# Patient Record
Sex: FEMALE | Race: WHITE | NOT HISPANIC OR LATINO | Employment: OTHER | URBAN - METROPOLITAN AREA
[De-identification: names, ages, dates, MRNs, and addresses within clinical notes are randomized per-mention and may not be internally consistent; named-entity substitution may affect disease eponyms.]

---

## 2017-01-17 ENCOUNTER — ALLSCRIPTS OFFICE VISIT (OUTPATIENT)
Dept: OTHER | Facility: OTHER | Age: 62
End: 2017-01-17

## 2017-02-10 ENCOUNTER — ALLSCRIPTS OFFICE VISIT (OUTPATIENT)
Dept: OTHER | Facility: OTHER | Age: 62
End: 2017-02-10

## 2017-02-22 ENCOUNTER — ALLSCRIPTS OFFICE VISIT (OUTPATIENT)
Dept: OTHER | Facility: OTHER | Age: 62
End: 2017-02-22

## 2017-03-01 ENCOUNTER — ALLSCRIPTS OFFICE VISIT (OUTPATIENT)
Dept: OTHER | Facility: OTHER | Age: 62
End: 2017-03-01

## 2017-04-11 ENCOUNTER — ALLSCRIPTS OFFICE VISIT (OUTPATIENT)
Dept: OTHER | Facility: OTHER | Age: 62
End: 2017-04-11

## 2017-05-01 ENCOUNTER — ALLSCRIPTS OFFICE VISIT (OUTPATIENT)
Dept: OTHER | Facility: OTHER | Age: 62
End: 2017-05-01

## 2017-06-21 ENCOUNTER — ALLSCRIPTS OFFICE VISIT (OUTPATIENT)
Dept: OTHER | Facility: OTHER | Age: 62
End: 2017-06-21

## 2017-06-21 DIAGNOSIS — Z12.39 ENCOUNTER FOR OTHER SCREENING FOR MALIGNANT NEOPLASM OF BREAST: ICD-10-CM

## 2017-06-27 ENCOUNTER — HOSPITAL ENCOUNTER (OUTPATIENT)
Dept: RADIOLOGY | Facility: HOSPITAL | Age: 62
Discharge: HOME | End: 2017-06-27
Attending: FAMILY MEDICINE
Payer: MEDICARE

## 2017-06-27 DIAGNOSIS — Z12.39 ENCOUNTER FOR OTHER SCREENING FOR MALIGNANT NEOPLASM OF BREAST: ICD-10-CM

## 2017-06-27 PROCEDURE — G0202 SCR MAMMO BI INCL CAD: HCPCS

## 2017-07-11 ENCOUNTER — ALLSCRIPTS OFFICE VISIT (OUTPATIENT)
Dept: OTHER | Facility: OTHER | Age: 62
End: 2017-07-11

## 2017-08-01 ENCOUNTER — GENERIC CONVERSION - ENCOUNTER (OUTPATIENT)
Dept: FAMILY MEDICINE CLINIC | Facility: CLINIC | Age: 62
End: 2017-08-01

## 2017-08-31 ENCOUNTER — ALLSCRIPTS OFFICE VISIT (OUTPATIENT)
Dept: OTHER | Facility: OTHER | Age: 62
End: 2017-08-31

## 2017-08-31 ENCOUNTER — GENERIC CONVERSION - ENCOUNTER (OUTPATIENT)
Dept: OTHER | Facility: OTHER | Age: 62
End: 2017-08-31

## 2017-08-31 DIAGNOSIS — E78.5 HYPERLIPIDEMIA: ICD-10-CM

## 2017-08-31 DIAGNOSIS — E03.9 HYPOTHYROIDISM: ICD-10-CM

## 2017-09-05 ENCOUNTER — APPOINTMENT (OUTPATIENT)
Dept: LAB | Facility: HOSPITAL | Age: 62
End: 2017-09-05
Attending: FAMILY MEDICINE
Payer: MEDICARE

## 2017-09-05 ENCOUNTER — TRANSCRIBE ORDERS (OUTPATIENT)
Dept: ADMINISTRATIVE | Facility: HOSPITAL | Age: 62
End: 2017-09-05

## 2017-09-05 DIAGNOSIS — E03.9 HYPOTHYROIDISM: ICD-10-CM

## 2017-09-05 DIAGNOSIS — E78.5 HYPERLIPIDEMIA: ICD-10-CM

## 2017-09-05 LAB
ALBUMIN SERPL BCP-MCNC: 3.2 G/DL (ref 3.5–5)
ALP SERPL-CCNC: 66 U/L (ref 46–116)
ALT SERPL W P-5'-P-CCNC: 46 U/L (ref 12–78)
ANION GAP SERPL CALCULATED.3IONS-SCNC: 7 MMOL/L (ref 4–13)
AST SERPL W P-5'-P-CCNC: 26 U/L (ref 5–45)
BILIRUB SERPL-MCNC: 0.2 MG/DL (ref 0.2–1)
BUN SERPL-MCNC: 16 MG/DL (ref 5–25)
CALCIUM SERPL-MCNC: 8.7 MG/DL (ref 8.3–10.1)
CHLORIDE SERPL-SCNC: 102 MMOL/L (ref 100–108)
CHOLEST SERPL-MCNC: 199 MG/DL (ref 50–200)
CO2 SERPL-SCNC: 31 MMOL/L (ref 21–32)
CREAT SERPL-MCNC: 0.93 MG/DL (ref 0.6–1.3)
GFR SERPL CREATININE-BSD FRML MDRD: 67 ML/MIN/1.73SQ M
GLUCOSE P FAST SERPL-MCNC: 98 MG/DL (ref 65–99)
HDLC SERPL-MCNC: 71 MG/DL (ref 40–60)
LDLC SERPL CALC-MCNC: 94 MG/DL (ref 0–100)
POTASSIUM SERPL-SCNC: 4.4 MMOL/L (ref 3.5–5.3)
PROT SERPL-MCNC: 6.8 G/DL (ref 6.4–8.2)
SODIUM SERPL-SCNC: 140 MMOL/L (ref 136–145)
TRIGL SERPL-MCNC: 168 MG/DL
TSH SERPL DL<=0.05 MIU/L-ACNC: 6 UIU/ML (ref 0.36–3.74)

## 2017-09-05 PROCEDURE — 84443 ASSAY THYROID STIM HORMONE: CPT

## 2017-09-05 PROCEDURE — 36415 COLL VENOUS BLD VENIPUNCTURE: CPT

## 2017-09-05 PROCEDURE — 80061 LIPID PANEL: CPT

## 2017-09-05 PROCEDURE — 80053 COMPREHEN METABOLIC PANEL: CPT

## 2017-09-06 ENCOUNTER — GENERIC CONVERSION - ENCOUNTER (OUTPATIENT)
Dept: OTHER | Facility: OTHER | Age: 62
End: 2017-09-06

## 2017-10-10 ENCOUNTER — GENERIC CONVERSION - ENCOUNTER (OUTPATIENT)
Dept: OTHER | Facility: OTHER | Age: 62
End: 2017-10-10

## 2017-11-01 ENCOUNTER — GENERIC CONVERSION - ENCOUNTER (OUTPATIENT)
Dept: OTHER | Facility: OTHER | Age: 62
End: 2017-11-01

## 2017-11-01 DIAGNOSIS — E03.9 HYPOTHYROIDISM: ICD-10-CM

## 2017-12-11 ENCOUNTER — TRANSCRIBE ORDERS (OUTPATIENT)
Dept: ADMINISTRATIVE | Facility: HOSPITAL | Age: 62
End: 2017-12-11

## 2017-12-11 ENCOUNTER — APPOINTMENT (OUTPATIENT)
Dept: LAB | Facility: HOSPITAL | Age: 62
End: 2017-12-11
Attending: FAMILY MEDICINE
Payer: MEDICARE

## 2017-12-11 DIAGNOSIS — E03.9 MYXEDEMA HEART DISEASE: Primary | ICD-10-CM

## 2017-12-11 DIAGNOSIS — E03.9 HYPOTHYROIDISM: ICD-10-CM

## 2017-12-11 DIAGNOSIS — I51.9 MYXEDEMA HEART DISEASE: Primary | ICD-10-CM

## 2017-12-11 DIAGNOSIS — E03.9 MYXEDEMA HEART DISEASE: ICD-10-CM

## 2017-12-11 DIAGNOSIS — I51.9 MYXEDEMA HEART DISEASE: ICD-10-CM

## 2017-12-11 LAB — TSH SERPL DL<=0.05 MIU/L-ACNC: 0.29 UIU/ML (ref 0.36–3.74)

## 2017-12-11 PROCEDURE — 84443 ASSAY THYROID STIM HORMONE: CPT

## 2017-12-11 PROCEDURE — 36415 COLL VENOUS BLD VENIPUNCTURE: CPT

## 2018-01-09 ENCOUNTER — ALLSCRIPTS OFFICE VISIT (OUTPATIENT)
Dept: OTHER | Facility: OTHER | Age: 63
End: 2018-01-09

## 2018-01-10 NOTE — PROGRESS NOTES
Assessment   1  Callus (700) (L84)   2  Atherosclerosis of arteries of extremities (440 20) (I70 209)   3  Pain in extremity (729 5) (M79 609)   4  Deformity of ankle and foot, acquired (736 70) (M21 969)    Plan    · Follow-up visit in 9 weeks Evaluation and Treatment  Follow-up  Status: Hold For -    Scheduling  Requested for: 65GLN8040   · Inspect your feet and legs daily if you have vascular disease ; Status:Complete;   Done:    55EXT2717 03:37PM    Discussion/Summary      Daily foot checks monitor for signs of infection  The patient, patient's caretaker was counseled regarding instructions for management,-- risk factor reductions,-- patient and family education,-- importance of compliance with treatment  The treatment plan was reviewed with the patient/guardian  The patient/guardian understands and agrees with the treatment plan      Chief Complaint   Patient is referred from [facility] for foot care  Patient has painful calluses, and, thick, painful nails that hurt when walking and wearing shoes  History of Present Illness   HPI: Patient has extremely, small stature, and very, small feet, stream pes planus painful calluses first and fifth, bilateral  Pain when walking  Thick, painful nails      Active Problems   1  Adult hypothyroidism (244 9) (E03 9)   2  Atherosclerosis of arteries of extremities (440 20) (I70 209)   3  Behavior problems (V40 9)   4  BMI 27 0-27 9,adult (V85 23) (Z68 27)   5  Callus (700) (L84)   6  Cerumen debris on tympanic membrane of left ear (380 4) (H61 22)   7  Deformity of ankle and foot, acquired (736 70) (M21 969)   8  Depression (311) (F32 9)   9  Difficulty In Walking Involving An Ankle/Foot Joint (719 7)   10  Hyperlipidemia (272 4) (E78 5)   11  Hypothyroidism (244 9) (E03 9)   12  Need for influenza vaccination (V04 81) (Z23)   13  Osteoporosis (733 00) (M81 0)   14  Pain in extremity (729 5) (M79 609)   15  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)   16   Trisomy 21, Down syndrome (758 0) (Q90 9)   17  Xerosis cutis (706 8) (L85 3)    Past Medical History    · History of Depression with anxiety (300 4) (F41 8)   · History of Dermatomycosis (111 9) (B36 9)   · History of Encounter for screening breast examination (V76 10) (Z12 31)   · History of Encounter for screening colonoscopy (V76 51) (Z12 11)   · History of Excessive cerumen in ear canal, bilateral (380 4) (H61 23)   · History of Fissure of skin (709 8) (R23 4)   · History of Foot joint pain (719 47) (M25 579)   · History of abrasion (V15 59) (Z87 828)   · History of athlete's foot (V12 09) (Z86 19)   · History of Down syndrome (758 0) (Q90 9)   · History of dysphagia (V12 79) (Z87 19)   · History of hyperkalemia (V12 29) (Z86 39)   · History of nausea and vomiting (V12 79) (D07 982)   · History of screening mammography (V15 89) (Z92 89)   · Hypothyroidism (244 9) (E03 9)   · History of Paronychia of toe, unspecified laterality (681 11) (L03 039)     The active problems and past medical history were reviewed and updated today  Family History   Mother    · No pertinent family history  Family History    · Family history of No Significant Family History    Social History    · Never A Smoker   · Never A Smoker   · Never Drank Alcohol   · Never Used Drugs    Current Meds    1  Alendronate Sodium 10 MG Oral Tablet; TAKE 1 TABLET BY MOUTH MORNING (8AM)     -[facility]; Therapy: 45LMG7170 to (Evaluate:86Hkn7791)  Requested for: 11Ive6611; Last     Rx:15Abz9618 Ordered   2  Daily Vitamin Plus Oral Capsule; TAKE 1 CAPSULE DAILY  Requested for: 57VMO9320; Last Rx:45Jdt6831 Ordered   3  Daily Angella Oral Tablet; TAKE 1 TABLET BY MOUTH EVERY DAY (8AM) -[facility]; Therapy: 89OQR7557 to (Last Rx:48Ybb2989)  Requested for: 79Fmc6925 Ordered   4  Debrox 6 5 % Otic Solution; INSTILL 4 DROP Twice daily at 7 am and 9pm Monday wednesday and Friday;      Therapy: 10UTS2027 to (Evaluate:10Mar2017)  Requested for: 15EXE5542; Last Rx:93Wuq5576 Ordered   5  Levothyroxine Sodium 50 MCG Oral Tablet; TAKE 1 TABLET BY MOUTH EVERY DAY     (8AM) -[facility]; Therapy: 57NXO4565 to (Evaluate:41Oxu2503)  Requested for: 62Yrt3057; Last     Rx:80Imx8148 Ordered   6  Levothyroxine Sodium 75 MCG Oral Tablet; take one tablet by mouth at 7 am daily     before rest of morning meds; Therapy: 66NTJ6897 to (Evaluate:30Apr2018)  Requested for: 14EGI2565; Last     Rx:01Nov2017 Ordered   7  LORazepam 0 5 MG Oral Tablet; TAKE 1 TABLET 3 TIMES DAILY AS NEEDED; Therapy: (Recorded:83Yui6241) to Recorded   8  Luvox 25 MG TABS; Take 1 tablet twice daily; Therapy: (Recorded:01May2017) to Recorded   9  Memantine HCl - 5 MG Oral Tablet; TAKE ONE TABLET BY MOUTH IN THE MORNING     (8AM) -[facility]; Therapy: 72IJT3298 to (Evaluate:00Lyb8901)  Requested for: 43Rnv2921; Last     Rx:88Esb6231 Ordered   10  Multi-Vitamins Oral Tablet; TAKE 1 TABLET BY MOUTH EVERY DAY (8AM) -[facility]; Therapy: 15UYU3940 to (Evaluate:26Mar2018)  Requested for: 27Oct2017; Last      Rx:44Hra8089 Ordered   11  Namenda 5 MG Oral Tablet; Take 1 tablet by mouth once daily at 8 a m; Therapy: 14ROH8400 to (Cintia Alejandra)  Requested for: 42RGW9552; Last      IZ:80UGP3547 Ordered   12  Simvastatin 20 MG Oral Tablet; TAKE 1 TABLET BY MOUTH EVERY DAY (8AM)      [facility]; Therapy: 71ZYK8757 to (Vince Temple)  Requested for: 59CDB2527; Last      Rx:63Kxp5757 Ordered   13  Vitamin D3 1000 UNIT Oral Tablet; TAKE 2 TABLETS (2000U) BY MOUTH EVERY DAY      AT 8PM (8PM) -[address] [place] [address]; Therapy: 78PZO5744 to (Evaluate:66Orl4130)  Requested for: 02Cgi7294; Last      Rx:38Mrv3263 Ordered     The medication list was reviewed and updated today  Allergies   1  No Known Drug Allergies  2  No Known Environmental Allergies   3   No Known Food Allergies    Vitals    Recorded: 84DWW3847 03:27PM   Heart Rate 78   Respiration 17   Height 4 ft 3 75 in   Weight 105 lb    BMI Calculated 27 57   BSA Calculated 1 28     Physical Exam        Constitutional: no acute distress, well appearing and well nourished  Cardiovascular: abnormal dorsalis pedis pulse,-- abnormal posterior tibialis pulse,-- dependence rubor-- and-- abnormal capillary refill  Orthopedic/Biomechanical: abnormal foot type,-- hammertoe(s),-- abnormal MPJ ROM,-- abnormal midtarsal joint ROM,-- abnormal subtalar joint ROM,-- decreased strength with dorsiflexion,-- decreased strength with plantarflexion,-- discolored nails,-- subungual debris-- and-- nail tenderness  Skin: abnormal texture,-- decreased skin turgor-- and-- keratoses  Left Foot: Appearance: Normal except as noted: a deformity-- and-- pes planus  Great toe deformities include a bunion  Second toe deformities include hammer toe  Third toe deformities include hammer toe  Forth toe deformities include hammer toe  Fifth toe deformities include hammer toe  Significant deformity of, bilateral feet, hammertoes, contracted, MPJs plantarflexed first met head  Special Tests: pre-ulcerative hyperkeratotic lesions plantar first metatarsal head bilateral right worse than left  Significant bunion deformity rigidly contracted hammertoes bilateral     Right Foot: Appearance: a deformity-- and-- pes planus  Great toe deformities include a bunion  Negative erythema  moderate edema mild hyperpigmentation lower legs bilateral  Negative calf tenderness  No signs of infection  Special Tests: All nails thick discolored dystrophic, positive subungual debris, positive pain on palpation  Neurological Exam: performed  Light touch was decreased bilaterally  Vibratory sensation was decreased in both first metatarsophalangeal joints  Response to monofilament test was intact bilaterally  Vascular Exam: Dorsalis pedis pulses were absent bilaterally  Posterior tibial pulses were absent bilaterally  Dependence rubor was present bilaterally   Capillary refill time was greater than 3 seconds bilaterally  Edema: mild bilaterally  Toenails: All of the toenails were elongated,-- hypertrophied,-- discolored,-- shown to have subungual debris-- and-- tender  Hyperkeratosis: present on both first sub metatarsals-- and-- present on both fifth sub metatarsals  Shoe Gear Evaluation: performed ()  Right Foot: width: d-- and-- length: 5  Left Foot: width: d-- and-- length: 5  Recommendation(s): athletic shoes  Procedure           Procedure: Debridement of hyperkeratosis  The procedure's risks and infection risk were discussed with the patient  Procedure Note:     Anesthesia: Aseptic debridement #10 scalpel  The area for debridement was located on the First metatarsal head  Fifth metatarsal head bilateral  Debridement was performed on partial thickness hyperkeratotic area(s)  Post-Procedure: the patient tolerated the procedure well  Complications: there were no complications  Procedure: toenail debridement performed  Indications for the procedure include professional performance of nail care required due to peripheral arterial disease-- and-- physical limitations  The procedure's risks and infection risk were discussed with the patient and with the guardian  Procedure Note: The toenails were debrided using heavy-duty nail nippers,-- with a nail ,-- the nail edges were smoothed-- and-- the hyperkeratotic material was grinded  Post-Procedure: the patient tolerated the procedure well  Complications: there were no complications  Aseptic debridement and planing of nails x10, manually, and mechanically debridement of pretrophic ulceration hyperkeratotic lesion first metatarsal head and hallux IPJ bilateral with #10 scalpel debrided nonviable tissue down to healthy viable tissue      Future Appointments      Date/Time Provider Specialty Site   02/14/2018 08:30 AM ASHISH Mixon   Family Medicine [facility]     Signatures    Electronically signed by :  Won Augustin DPM; Jan 9 2018  3:38PM EST                       (Author)

## 2018-01-10 NOTE — PROGRESS NOTES
Assessment    1  Encounter for preventive health examination (V70 0) (Z00 00)   2  Trisomy 21, Down syndrome (758 0) (Q90 9)    Plan  Depression, Depression with anxiety    · Memantine HCl - 5 MG Oral Tablet; TAKE ONE TABLET BY MOUTH IN THE  MORNING (8AM) -[facility]  Health Maintenance    · Multi-Vitamins Oral Tablet; TAKE ONE TABLET BY MOUTH DAILY AT 8 A M  Osteoporosis    · Alendronate Sodium 10 MG Oral Tablet; TAKE 1 TABLET BY MOUTH MORNING  (8AM) -[facility]   · Vitamin D3 1000 UNIT Oral Tablet; TAKE 2 TABLETS (2000U) BY MOUTH  EVERY DAY AT 8PM -JIM    Discussion/Summary  health maintenance visit      Chief Complaint  CPE 62 yo      History of Present Illness  HM, Adult Female: The patient is being seen for a health maintenance and mentally challenged evaluation  The last health maintenance visit was 12 month(s) ago  General Health: The patient's health since the last visit is described as good   patient is mostly non verbal    Lifestyle:  She consumes a diverse and healthy diet  She has weight concerns  She does not exercise regularly  She does not use tobacco  She denies alcohol use  She denies drug use  Screening:      Review of Systems    Constitutional: No fever, no chills, feels well, no tiredness, no recent weight gain or weight loss  Eyes: No complaints of eye pain, no red eyes, no eyesight problems, no discharge, no dry eyes, no itching of eyes  ENT: no complaints of earache, no loss of hearing, no nose bleeds, no nasal discharge, no sore throat, no hoarseness  Cardiovascular: No complaints of slow heart rate, no fast heart rate, no chest pain, no palpitations, no leg claudication, no lower extremity edema  Respiratory: No complaints of shortness of breath, no wheezing, no cough, no SOB on exertion, no orthopnea, no PND  Gastrointestinal: No complaints of abdominal pain, no constipation, no nausea or vomiting, no diarrhea, no bloody stools     Genitourinary: No complaints of dysuria, no incontinence, no pelvic pain, no dysmenorrhea, no vaginal discharge or bleeding  Musculoskeletal: No complaints of arthralgias, no myalgias, no joint swelling or stiffness, no limb pain or swelling  Integumentary: No complaints of skin rash or lesions, no itching, no skin wounds, no breast pain or lump  Neurological: No complaints of headache, no confusion, no convulsions, no numbness, no dizziness or fainting, no tingling, no limb weakness, no difficulty walking  Psychiatric: Not suicidal, no sleep disturbance, no anxiety or depression, no change in personality, no emotional problems  Hematologic/Lymphatic: No complaints of swollen glands, no swollen glands in the neck, does not bleed easily, does not bruise easily  Active Problems    1  Atherosclerosis of arteries of extremities (440 20) (I70 209)   2  Behavior problems (V40 9)   3  BMI 27 0-27 9,adult (V85 23) (Z68 27)   4  Callus (700) (L84)   5  Choking episode (784 99) (R09 89)   6  Deformity of ankle and foot, acquired (736 70) (M21 969)   7  Depression (311) (F32 9)   8  Depression with anxiety (300 4) (F41 8)   9  Difficulty In Walking Involving An Ankle/Foot Joint (719 7)   10  Dysphagia (787 20) (R13 10)   11  Encounter for screening colonoscopy (V76 51) (Z12 11)   12  Encounter for screening mammogram for malignant neoplasm of breast (V76 12)    (Z12 31)   13  Hyperlipidemia (272 4) (E78 5)   14  Hypothyroidism (244 9) (E03 9)   15  Osteoporosis (733 00) (M81 0)   16  Pain in extremity (729 5) (M79 609)   17  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)   18  Trisomy 21, Down syndrome (758 0) (Q90 9)   19   Xerosis cutis (706 8) (L85 3)    Past Medical History    · History of Cerumen debris on tympanic membrane of left ear (380 4) (H61 22)   · History of Dermatomycosis (111 9) (B36 9)   · History of Fissure of skin (709 8) (R23 4)   · History of Foot joint pain (719 47) (M25 579)   · History of athlete's foot (V12 09) (Z86 19)   · History of Down syndrome (758 0) (Q90 9)   · History of hyperkalemia (V12 29) (Z86 39)   · Hypothyroidism (244 9) (E03 9)   · History of Paronychia of toe, unspecified laterality (681 11) (L03 039)    Family History  Mother    · No pertinent family history  Family History    · Family history of No Significant Family History    Social History    · Never A Smoker   · Never A Smoker   · Never Drank Alcohol   · Never Used Drugs    Current Meds   1  Alendronate Sodium 10 MG Oral Tablet; TAKE 1 TABLET BY MOUTH MORNING (8AM)   -[facility]; Therapy: 04BED4197 to (Last Rx:21Mar2016)  Requested for: 21Mar2016 Ordered   2  Daily Vitamin Plus Oral Capsule; TAKE 1 CAPSULE DAILY  Requested for: 52EFT1678; Last Rx:82Avp5241 Ordered   3  Daily Angella Oral Tablet; TAKE 1 TABLET BY MOUTH EVERY DAY (8AM) [facility]; Therapy: 92JNM5325 to (Last Rx:22May2015)  Requested for: 47SZN6936 Ordered   4  Debrox 6 5 % Otic Solution; USE AS DIRECTED; Therapy: 25IHV1261 to (Last Rx:17May2016) Ordered   5  FluvoxaMINE Maleate 25 MG Oral Tablet; TAKE 2 TABLETS AT BEDTIME; Therapy: (Recorded:74Awd0798) to Recorded   6  Levothyroxine Sodium 50 MCG Oral Tablet; TAKE 1 TABLET BY MOUTH EVERY DAY   (8AM) -[facility]; Therapy: 95OLJ2130 to (Last Rx:18Feb2016)  Requested for: 84SSF8649 Ordered   7  LORazepam 0 5 MG Oral Tablet; TAKE 1 TABLET 3 TIMES DAILY AS NEEDED; Therapy: (Recorded:09Hkh2912) to Recorded   8  Memantine HCl - 5 MG Oral Tablet; TAKE ONE TABLET BY MOUTH IN THE MORNING   (8AM) -[facility]; Therapy: 71EWM0033 to (Last Rx:18Feb2016)  Requested for: 01WQL2701 Ordered   9  Multi-Vitamins Oral Tablet; TAKE ONE TABLET BY MOUTH DAILY AT 8 A M;   Therapy: 99LGB7572 to (Last Rx:22Jan2016)  Requested for: 10OCV6660 Ordered   10  Namenda 5 MG Oral Tablet; Take 1 tablet by mouth once daily at 8 a m; Therapy: 43VQL8348 to (Ji Rojas)  Requested for: 72QOB8918; Last    TN:12TFU3207 Ordered   11   Simvastatin 20 MG Oral Tablet; TAKE 1 TABLET BY MOUTH EVERY DAY (8AM)    -[facility]; Therapy: 82CKL3249 to (Last Rx:40Tra8183)  Requested for: 28SCQ5704 Ordered   12  Vitamin D 1000 UNIT Oral Tablet; TAKE 2 TABLETS (2000U) BY MOUTH EVERY DAY AT    8PM -[facility]; Therapy: 32RXW2314 to (Last Rx:81Htp7684)  Requested for: 62HYW6536 Ordered   13  Vitamin D3 1000 UNIT Oral Tablet; TAKE 2 TABLETS (2000U) BY MOUTH EVERY DAY    AT 8PM [facility]; Therapy: 82LJD0017 to (Last Rx:90Mnb9214)  Requested for: 94Uby6163 Ordered    Allergies    1  No Known Drug Allergies    2  No Known Environmental Allergies   3  No Known Food Allergies    Vitals   Recorded: 15MAK8924 09:28AM   Temperature 96 8 F   Heart Rate 75   Respiration 18   Systolic 96   Diastolic 60   Height 4 ft 3 75 in   Weight 103 lb    BMI Calculated 27 04   BSA Calculated 1 26   O2 Saturation 98     Physical Exam    Constitutional short  Head and Face   Head and face: Normal     Palpation of the face and sinuses: No sinus tenderness  Eyes   Conjunctiva and lids: No swelling, erythema or discharge  Pupils and irises: Equal, round, reactive to light  Ears, Nose, Mouth, and Throat   Otoscopic examination: Tympanic membranes translucent with normal light reflex  Canals patent without erythema  Hearing: Normal     Nasal mucosa, septum, and turbinates: Normal without edema or erythema  Lips, teeth, and gums: Normal, good dentition  Oropharynx: Normal with no erythema, edema, exudate or lesions  Neck   Neck: Supple, symmetric, trachea midline, no masses  Pulmonary   Respiratory effort: No increased work of breathing or signs of respiratory distress  Auscultation of lungs: Clear to auscultation  Cardiovascular   Palpation of heart: Normal PMI, no thrills  Auscultation of heart: Normal rate and rhythm, normal S1 and S2, no murmurs  Chest done by separate gyn  Abdomen   Abdomen: Non-tender, no masses  Genitourinary separate gyn     Lymphatic   Palpation of lymph nodes in neck: No lymphadenopathy  Palpation of lymph nodes in axillae: No lymphadenopathy  Musculoskeletal   Gait and station: Normal     Neurologic   Reflexes: 2+ and symmetric  Coordination: Normal finger to nose and heel to shin  Psychiatric unable  Future Appointments    Date/Time Provider Specialty Site   07/15/2016 03:45 PM Faye Denney DPM Podiatry [address] [facility]     Signatures   Electronically signed by :  ASHISH Baker ; Jul 14 2016 10:36AM EST                       (Author)

## 2018-01-10 NOTE — PROCEDURES
Procedures by ISMAEL Camilo at 2016 11:15 AM      Author:  ISMAEL Camilo Service:  (none) Author Type:  Speech and Language Pathologist     Filed:  2016 11:28 AM Date of Service:  2016 11:15 AM Status:  Signed     :  ISMAEL Camilo (Speech and Language Pathologist)                                               Video Fluoroscopic Swallow Study      Patient Name: Catheline Dance Today's Date: 2016  par  par  Past Medical History  Past Medical History     Diagnosis  Date    Dementia     Disease of thyroid gland     Diverticulosis     Down's syndrome     Fatty liver     Hyperlipidemia     Mental retardation     Osteoporosis     Psychiatric disorder         Past Surgical History  No past surgical history on file  General Information:  Ordering Physician: Dr Renny Valdes  Radiologist: Dr Chetna Balderas  Date of Order: 16  Date of Evaluation: 16  Type of Study: Initial MBS  Diet Prior to this Study: Regular diet and thin liquids  Past Medical History: Down's Syndrome, Dementia, MR, HLD, OP, and diverticulosis  Additional History: Patient with choking episodes on chicken (solid foods)  Positionin degrees in the lateral plane  Materials Administered: Puree, soft/ solid foods, and thin liquid    Oral Stage:  Impaired  Puree: Within functional limits  Soft: Poor mastication  Regular: Poor mastication  Thin Liquid:  Within functional limits  Oral Stage Performance: Mildly impaired for soft/ solid foods due to limited dentition and reduced mastication  Oral Phase Summary: Patient with decreased chew of soft/ solid foods and very rapid swallow trigger  Pharyngeal Stage:  Within Functional Limits  Pharyngeal Stage Performance: [facility]  Swallow Mechanics  Swallow Initiation was: Prompt  Hyolaryngeal Excursion was: Adequate  Epiglottic Inversion was: Present  Tongue Drive was: Adequate  Pharyngeal Constriction was:  Adequate  Cricopharyngeal Opening/ Relaxation was: Adequate  A prominent cricopharyngeus muscle was noted  Pharyngeal Phase Summary: No laryngeal penetration or aspiration of solid, soft, or pureed foods or thin liquids during or following all swallows  Esophageal Stage:  Impaired  Delayed Clearance: Thoracic esophagus - stasis noted which cleared with a liquid wash  Esophageal Phase Summary:  Reduced esophageal peristalsis  Impressions:  Oral pharyngeal swallow skills are within functional limits and safest for mechanical soft chopped/ ground solids and thin/ all liquids  There was no laryngeal penetration or aspiration of all foods and thin liquid during or following all swallows  Patient is known to eat rapidly and does not chew foods thoroughly enough  Reduced esophageal peristalsis also noted  Diagnosis/ Prognosis:  Mild to moderate oral stage dysphagia for solid foods  Prognosis for Safe Diet Advancement: Guarded  Barriers to Reach Goals: Cognitive deficits, Impulsive with intake  Results and Recommendations Reviewed with: Patient caregiver  Recommendations:  Oral feeding  Diet Texture: Dysphagia 3 Advanced (chopped/ ground solids)  Liquid Consistency: Thin/ All Liquids  Liquid Administration: Cup, Straw  Medication Administration: Medication in puree bolus  Suggested Postioning: Upright/ 90 degrees  Suggested Precautions: Aspiration precautions, Feed fully upright position, Cue for small bites/ sips, Cue for slow rate, Reflux precautions, Remain upright 45 degrees after meals  Strategies: Supervision for slow pace, Small bites/ sips, Cue to chew foods thoroughly, Alternate food/ liquid intakes    Dysphagia Treatment Recommended: No  Consider Follow-up VBS: PRN                           Received for:Provider  EPIC   May 25 2016 11:28AM Eastern Standard Time

## 2018-01-12 VITALS
BODY MASS INDEX: 23.61 KG/M2 | TEMPERATURE: 95 F | DIASTOLIC BLOOD PRESSURE: 70 MMHG | RESPIRATION RATE: 18 BRPM | HEIGHT: 55 IN | SYSTOLIC BLOOD PRESSURE: 102 MMHG | HEART RATE: 74 BPM | WEIGHT: 102 LBS

## 2018-01-12 VITALS
WEIGHT: 101 LBS | DIASTOLIC BLOOD PRESSURE: 62 MMHG | HEIGHT: 55 IN | BODY MASS INDEX: 23.37 KG/M2 | SYSTOLIC BLOOD PRESSURE: 100 MMHG

## 2018-01-12 VITALS
HEIGHT: 55 IN | WEIGHT: 99 LBS | DIASTOLIC BLOOD PRESSURE: 70 MMHG | TEMPERATURE: 97.3 F | RESPIRATION RATE: 18 BRPM | SYSTOLIC BLOOD PRESSURE: 98 MMHG | HEART RATE: 67 BPM | OXYGEN SATURATION: 98 % | BODY MASS INDEX: 22.91 KG/M2

## 2018-01-13 VITALS
BODY MASS INDEX: 23.37 KG/M2 | SYSTOLIC BLOOD PRESSURE: 100 MMHG | OXYGEN SATURATION: 98 % | HEART RATE: 74 BPM | HEIGHT: 55 IN | DIASTOLIC BLOOD PRESSURE: 60 MMHG | RESPIRATION RATE: 18 BRPM | WEIGHT: 101 LBS

## 2018-01-14 VITALS
WEIGHT: 97 LBS | SYSTOLIC BLOOD PRESSURE: 93 MMHG | HEIGHT: 55 IN | BODY MASS INDEX: 22.45 KG/M2 | DIASTOLIC BLOOD PRESSURE: 48 MMHG

## 2018-01-14 VITALS
OXYGEN SATURATION: 94 % | RESPIRATION RATE: 18 BRPM | DIASTOLIC BLOOD PRESSURE: 50 MMHG | TEMPERATURE: 97 F | SYSTOLIC BLOOD PRESSURE: 90 MMHG | BODY MASS INDEX: 23.4 KG/M2 | WEIGHT: 101.13 LBS | HEIGHT: 55 IN | HEART RATE: 71 BPM

## 2018-01-14 VITALS — WEIGHT: 102 LBS | BODY MASS INDEX: 23.61 KG/M2 | HEIGHT: 55 IN

## 2018-01-15 NOTE — PROGRESS NOTES
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 70) (M21 969)   4  Pain in extremity (729 5) (M79 609)    Plan    · Follow-up visit in 3 months Evaluation and Treatment  Follow-up  Status: Hold For -  Scheduling  Requested for: 56LYV4407   · Inspect your feet and legs daily if you have vascular disease ; Status:Complete;   Done:  89BWD3965 03:24PM            Follow-up visit in 3 months Evaluation and Treatment  Follow-up  Status: Hold For - Scheduling  Requested for: 62Bfb9779  Ordered; For: Foot joint pain;  Ordered By: Alysa Gaspar  Performed:   Due: 44AHL0037     Discussion/Summary    Patient to apply urea cream twice a day  Daily foot checks monitor for signs of infection  The treatment plan was reviewed with the patient/guardian  The patient/guardian understands and agrees with the treatment plan      Chief Complaint  Patient is referred from [facility] for foot care  Patient has painful calluses, and, thick, painful nails that hurt when walking and wearing shoes  History of Present Illness  HPI: Patient has extremely, small stature, and very, small feet, stream pes planus painful calluses first and fifth, bilateral  Pain when walking  Thick, painful nails      Active Problems    1  Atherosclerosis of arteries of extremities (440 20) (I70 209)   2  Behavior problems (V40 9) (F69)   3  Callus (700) (L84)   4  Deformity of ankle and foot, acquired (736 70) (M21 969)   5  Depression (311) (F32 9)   6  Depression with anxiety (300 4) (F41 8)   7  Difficulty In Walking Involving An Ankle/Foot Joint (719 7)   8  Encounter for screening colonoscopy (V76 51) (Z12 11)   9  Hyperlipidemia (272 4) (E78 5)   10  Hypothyroidism (244 9) (E03 9)   11  Osteoporosis (733 00) (M81 0)   12  Pain in extremity (729 5) (M79 609)   13  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)   14   Trisomy 21, Down syndrome (758 0) (Q90 9) 15  Xerosis cutis (706 8) (L85 3)    Past Medical History    · History of Dermatomycosis (111 9) (B36 9)   · History of Fissure of skin (709 8) (R23 4)   · History of Foot joint pain (719 47) (M25 579)   · History of athlete's foot (V12 09) (Z86 19)   · History of Down syndrome (758 0) (Q90 9)   · History of hyperkalemia (V12 29) (Z86 39)   · Hypothyroidism (244 9) (E03 9)   · History of Paronychia of toe, unspecified laterality (681 11) (L03 039)    The active problems and past medical history were reviewed and updated today  Family History    · No pertinent family history    · Family history of No Significant Family History    Social History    · Never A Smoker   · Never A Smoker   · Never Drank Alcohol   · Never Used Drugs    Current Meds   1  Alendronate Sodium 10 MG Oral Tablet; TAKE 1 TABLET BY MOUTH MORNING (8AM)   -[facility]; Therapy: 75JMT0065 to (Last Rx:23Qwh9832)  Requested for: 68WTO4951 Ordered   2  Daily Vitamin Plus Oral Capsule; TAKE 1 CAPSULE DAILY  Requested for: 26GLT4077; Last Rx:15Aou7383 Ordered   3  Daily Angella Oral Tablet; TAKE 1 TABLET BY MOUTH EVERY DAY (8AM) [facility]; Therapy: 84IWZ5788 to (Last Rx:22Zep9413)  Requested for: 97UGW4781 Ordered   4  FluvoxaMINE Maleate 25 MG Oral Tablet; TAKE 2 TABLETS AT BEDTIME; Therapy: (Recorded:16Gcc1285) to Recorded   5  Levothyroxine Sodium 50 MCG Oral Tablet; TAKE 1 TABLET BY MOUTH EVERY DAY   (8AM) -[facility]; Therapy: 12QEP7884 to (Last Rx:59Qtw7731)  Requested for: 70ZXE9758 Ordered   6  LORazepam 0 5 MG Oral Tablet; TAKE 1 TABLET 3 TIMES DAILY AS NEEDED; Therapy: (Recorded:90Yti1292) to Recorded   7  Memantine HCl - 5 MG Oral Tablet; TAKE ONE TABLET BY MOUTH IN THE MORNING   (8AM) [facility]; Therapy: 11LRG0658 to (Last Rx:71Ahx2830)  Requested for: 20MYA9651 Ordered   8  Multi-Vitamins Oral Tablet; TAKE 1 TABLET BY MOUTH EVERY DAY (8AM) -[facility];    Therapy: 01YUE9882 to (Last Rx:60Ccr6823)  Requested for: 02WNN4285 Ordered   9  Namenda 5 MG Oral Tablet; Take 1 tablet by mouth once daily at 8 a m; Therapy: 80XDG9882 to ([facility])  Requested for: 76DUD6566; Last   KH:64LWO4814 Ordered   10  Simvastatin 20 MG Oral Tablet; TAKE 1 TABLET BY MOUTH EVERY DAY (8AM)    [facility]; Therapy: 38FMO7660 to (Last Rx:70Dlk5943)  Requested for: 79Hbm2107 Ordered   11  Vitamin D 1000 UNIT Oral Tablet; TAKE 2 TABLETS (2000U) BY MOUTH EVERY DAY AT    8PM [facility]; Therapy: 80HGI8872 to (Last Rx:32Vin9986)  Requested for: 00ZPA5784 Ordered   12  Vitamin D3 1000 UNIT Oral Tablet; TAKE 2 TABLETS (2000U) BY MOUTH EVERY DAY    AT 8PM [facility]; Therapy: 88ZLN8018 to (Last Rx:06Yjw0154)  Requested for: 48Vll0715 Ordered    The medication list was reviewed and updated today  Allergies    1  No Known Drug Allergies    2  No Known Environmental Allergies   3  No Known Food Allergies    Physical Exam    Constitutional: no acute distress, well appearing and well nourished  Cardiovascular: abnormal dorsalis pedis pulse, abnormal posterior tibialis pulse, dependence rubor and abnormal capillary refill  Orthopedic/Biomechanical: abnormal foot type, hammertoe(s), abnormal MPJ ROM, abnormal midtarsal joint ROM, abnormal subtalar joint ROM, decreased strength with dorsiflexion, decreased strength with plantarflexion, discolored nails, subungual debris and nail tenderness  Skin: abnormal texture, decreased skin turgor and keratoses  Left Foot: Appearance: Normal except as noted: a deformity and pes planus  Great toe deformities include a bunion  Second toe deformities include hammer toe  Third toe deformities include hammer toe  Forth toe deformities include hammer toe  Fifth toe deformities include hammer toe  Significant deformity of, bilateral feet, hammertoes, contracted, MPJs plantarflexed first met head   Special Tests: Severe xerosis bilateral fissures bilateral heels negative erythema negative exudate moderate interdigital maceration third and fourth interspace bilateral    Right Foot: Appearance: a deformity and pes planus  Great toe deformities include a bunion  Moderate xerosis plantar feet bilateral  Special Tests: All nails thick discolored dystrophic, positive subungual debris, positive pain on palpation  Neurological Exam: performed  Light touch was decreased bilaterally  Vibratory sensation was decreased in both first metatarsophalangeal joints  Response to monofilament test was intact bilaterally  Vascular Exam: Dorsalis pedis pulses were absent bilaterally  Posterior tibial pulses were absent bilaterally  Dependence rubor was present bilaterally  Capillary refill time was greater than 3 seconds bilaterally  Edema: mild bilaterally  Toenails: All of the toenails were elongated, hypertrophied, discolored, shown to have subungual debris and tender  Hyperkeratosis: present on both first sub metatarsals and present on both fifth sub metatarsals  Shoe Gear Evaluation: performed ()  Right Foot: width: d and length: 5  Left Foot: width: d and length: 5  Recommendation(s): athletic shoes  Procedure      Procedure: Debridement of hyperkeratosis  The procedure's risks and infection risk were discussed with the patient  Procedure Note:  Anesthesia: Aseptic debridement #10 scalpel  The area for debridement was located on the First metatarsal head  Fifth metatarsal head bilateral  Debridement was performed on partial thickness hyperkeratotic area(s)  Post-Procedure: the patient tolerated the procedure well  Complications: there were no complications  Procedure: toenail debridement performed  Indications for the procedure include professional performance of nail care required due to peripheral arterial disease and physical limitations  The procedure's risks and infection risk were discussed with the patient and with the guardian    Procedure Note: The toenails were debrided using heavy-duty nail nippers, with a nail , the nail edges were smoothed and the hyperkeratotic material was grinded  Post-Procedure: the patient tolerated the procedure well  Complications: there were no complications  Aseptic debridement and planing of nails x10, manually, and mechanically  Aseptic debridement tyloma x4       Future Appointments    Date/Time Provider Specialty Site   04/15/2016 03:30 PM Faye Denney DPM Podiatry [address] [facility]     Signatures   Electronically signed by :  Mitch Franklin DPM; Jan 22 2016  3:34PM EST                       (Author)

## 2018-01-15 NOTE — PROCEDURES
Procedures by ISMAEL Soriano at 2016  2:41 PM      Author:  ISMAEL Soriano Service:  (none) Author Type:  Speech and Language Pathologist     Filed:  2016  3:24 PM Date of Service:  2016  2:41 PM Status:  Signed     :  ISMAEL Soriano (Speech and Language Pathologist)                   Video Fluoroscopic Swallow Study      Patient Name: Roma Garcias  Today's Date: 2016    Past Medical History  Past Medical History     Diagnosis  Date    Dementia     Disease of thyroid gland     Diverticulosis     Down's syndrome     Fatty liver     Hyperlipidemia     Mental retardation     Osteoporosis     Psychiatric disorder      Past Surgical History  No past surgical history on file  General Information:  Ordering Physician: Dr Harjeet Agustin  Radiologist: Dr Ruth Olvera  Date of Order: 16  Date of Evaluation: 16  Type of Study: Repeat MBS  Diet Prior to this Study: Mechanical soft/ chopped diet and Thin liquids  Past Medical History: Down's Syndrome, Cognitive impairment, Dementia  Additional History: Patient dislikes ground/ chopped foods  Positionin degree angle in the lateral plane  Materials Administered: Puree, soft/ solid food, and thin liquid  Oral Stage:  Puree:  Rapid bolus transport  Soft chewables and Solids:  Rapid mastication and bolus transport  Thin Liquid:  Within functional limits, Good oral control  Oral Stage Summary: Rapid oral phase for all boluses  Pharyngeal Stage:  Within functional limits for all textures and thin liquids  No laryngeal penetration or aspiration of all foods or thin liquids evident during or following all swallows  There were very minimal pharyngeal residuals (in the valleculae) following  swallows of foods  Pharyngeal Stage Summary: Within functional limits for all textures  Swallow Mechanics  Swallow Initiation was:  Prompt  Hyolaryngeal Excursion was:   Adequate  Epiglottic Inversion was: Present  Tongue Drive was: Adequate  Pharyngeal Constriction was: Adequate  Cricopharyngeal Opening/ Relaxation was: Adequate  Cricopharyngeal Prominence/ Bar: Noted  Pharyngeal Stage Summary: Within functional limits for all textures  Esophageal Stage:  Within functional limits at the cervical level  No back flow or stasis evident at the cervical/ proximal level  Impressions:  Oral pharyngeal swallow skills are safe/ within functional limits for regular solids and thin liquids  Solid foods should be cut into small pieces  Diagnosis/ Prognosis:  Oral pharyngeal swallow skills are within functional limits for solid foods and thin/ all liquids  Ms Loni Henriquez requires supervision and cueing for small bites of food, to chew solids thoroughly, and eat slowly  Alternate solid and liquid intakes  to assist with pacing during meals  Recommendations:  Diet Texture: Regular (cut food in small pieces)  Liquid Consistency: Thin  Liquid Administration: Cup or straw  Medication Administration: Medication in puree bolus  Suggested Positioning: Upright/ 90 degrees  Suggested Precautions: Aspiration precautions, Cue for small bites of food, Cue for slow rate, Reflux precautions, Remain upright 45 degrees after meals  Strategies: Small bites/ sips, Alternate food/ liquid intakes, Slow pace  Dysphagia Treatment Recommended: No   (Vital Stim Therapy is not warranted at this time)  Consider Follow-up VFSS: PRN    Results and recommendations were discussed with Ms Loni Henriquez and her caregiver following the study  Her caregiver demonstrated adequate understanding of all information provided at this time  If you should have any questions or require additional information,  please contact this therapist at [phone]  It was a pleasure to evaluate Ms Loni Henriquez    Thank you for this referral       Nathan Joe, [facility], [address] [place], [place] [id number]  Speech Language Pathologist  [address] [place] [address] for:Provider  EPIC   Dec 19 2016  3:24PM [facility] Standard Time

## 2018-01-16 NOTE — MISCELLANEOUS
Provider Comments  Provider Comments:   ATTEMPTED TO REACH PT REGARDING MISSED APPT, UNABLE TO LEAVE  A MESSAGE /AT        Signatures   Electronically signed by :  ASHISH Flores ; Feb 22 2017  5:01PM EST                       (Author)

## 2018-01-22 VITALS — BODY MASS INDEX: 23.84 KG/M2 | HEIGHT: 55 IN | WEIGHT: 103 LBS

## 2018-01-22 VITALS
SYSTOLIC BLOOD PRESSURE: 90 MMHG | HEART RATE: 72 BPM | TEMPERATURE: 97.9 F | DIASTOLIC BLOOD PRESSURE: 58 MMHG | HEIGHT: 55 IN | WEIGHT: 103 LBS | BODY MASS INDEX: 23.84 KG/M2 | OXYGEN SATURATION: 92 % | RESPIRATION RATE: 18 BRPM

## 2018-01-22 VITALS
RESPIRATION RATE: 18 BRPM | BODY MASS INDEX: 24.36 KG/M2 | DIASTOLIC BLOOD PRESSURE: 80 MMHG | OXYGEN SATURATION: 94 % | WEIGHT: 105.25 LBS | TEMPERATURE: 96.7 F | SYSTOLIC BLOOD PRESSURE: 110 MMHG | HEIGHT: 55 IN | HEART RATE: 73 BPM

## 2018-01-23 VITALS — BODY MASS INDEX: 24.3 KG/M2 | HEIGHT: 55 IN | WEIGHT: 105 LBS | HEART RATE: 78 BPM | RESPIRATION RATE: 17 BRPM

## 2018-01-25 DIAGNOSIS — E03.9 HYPOTHYROIDISM: ICD-10-CM

## 2018-01-27 ENCOUNTER — APPOINTMENT (OUTPATIENT)
Dept: LAB | Facility: HOSPITAL | Age: 63
End: 2018-01-27
Attending: FAMILY MEDICINE
Payer: MEDICARE

## 2018-01-27 ENCOUNTER — TRANSCRIBE ORDERS (OUTPATIENT)
Dept: ADMINISTRATIVE | Facility: HOSPITAL | Age: 63
End: 2018-01-27

## 2018-01-27 DIAGNOSIS — E03.9 HYPOTHYROIDISM: ICD-10-CM

## 2018-01-27 LAB — TSH SERPL DL<=0.05 MIU/L-ACNC: 3.7 UIU/ML (ref 0.36–3.74)

## 2018-01-27 PROCEDURE — 84443 ASSAY THYROID STIM HORMONE: CPT

## 2018-01-27 PROCEDURE — 36415 COLL VENOUS BLD VENIPUNCTURE: CPT

## 2018-02-01 ENCOUNTER — TELEPHONE (OUTPATIENT)
Dept: FAMILY MEDICINE CLINIC | Facility: CLINIC | Age: 63
End: 2018-02-01

## 2018-02-01 DIAGNOSIS — E03.9 ACQUIRED HYPOTHYROIDISM: Primary | ICD-10-CM

## 2018-02-01 RX ORDER — LEVOTHYROXINE SODIUM 0.03 MG/1
25 TABLET ORAL DAILY
Qty: 90 TABLET | Refills: 0 | Status: SHIPPED | OUTPATIENT
Start: 2018-02-01 | End: 2018-04-03 | Stop reason: DRUGHIGH

## 2018-02-06 NOTE — PROGRESS NOTES
Phone call to group home again, no answer  Will send a certified letter to patient with these results and recommendations

## 2018-02-14 ENCOUNTER — OFFICE VISIT (OUTPATIENT)
Dept: FAMILY MEDICINE CLINIC | Facility: CLINIC | Age: 63
End: 2018-02-14
Payer: MEDICARE

## 2018-02-14 VITALS
WEIGHT: 106.7 LBS | HEART RATE: 67 BPM | BODY MASS INDEX: 24.69 KG/M2 | HEIGHT: 55 IN | SYSTOLIC BLOOD PRESSURE: 92 MMHG | RESPIRATION RATE: 16 BRPM | DIASTOLIC BLOOD PRESSURE: 58 MMHG | OXYGEN SATURATION: 95 %

## 2018-02-14 DIAGNOSIS — M81.0 AGE-RELATED OSTEOPOROSIS WITHOUT CURRENT PATHOLOGICAL FRACTURE: Primary | ICD-10-CM

## 2018-02-14 DIAGNOSIS — E03.8 OTHER SPECIFIED HYPOTHYROIDISM: ICD-10-CM

## 2018-02-14 DIAGNOSIS — R13.11 ORAL PHASE DYSPHAGIA: ICD-10-CM

## 2018-02-14 PROCEDURE — 99213 OFFICE O/P EST LOW 20 MIN: CPT | Performed by: FAMILY MEDICINE

## 2018-02-14 RX ORDER — ALENDRONATE SODIUM 10 MG/1
70 TABLET ORAL
Qty: 12 TABLET | Refills: 0 | Status: SHIPPED | OUTPATIENT
Start: 2018-02-14 | End: 2018-02-14 | Stop reason: SDUPTHER

## 2018-02-14 RX ORDER — ALENDRONATE SODIUM 10 MG/1
TABLET ORAL
Qty: 12 TABLET | Refills: 0 | Status: SHIPPED | OUTPATIENT
Start: 2018-02-14 | End: 2018-02-14 | Stop reason: SDUPTHER

## 2018-02-14 RX ORDER — ALENDRONATE SODIUM 10 MG/1
TABLET ORAL
Qty: 12 TABLET | Refills: 0 | Status: SHIPPED | OUTPATIENT
Start: 2018-02-14 | End: 2018-09-06 | Stop reason: DRUGHIGH

## 2018-02-14 NOTE — PROGRESS NOTES
Assessment/Plan:    No problem-specific Assessment & Plan notes found for this encounter  Diagnoses and all orders for this visit:    Age-related osteoporosis without current pathological fracture  -     Discontinue: alendronate (FOSAMAX) 10 mg tablet; Take 7 tablets (70 mg total) by mouth weekly before breakfast Take one pill weekly mon at 8 am  -     alendronate (FOSAMAX) 10 mg tablet; Take one pill weekly mon at 8 am    Other specified hypothyroidism    Oral phase dysphagia    Other orders  -     carbamide peroxide (DEBROX) 6 5 % otic solution; Administer into ears Twice daily          Subjective:      Patient ID: Viridiana Way is a 63 y o  female  Patient is here to have some updated paperwork done for her facility one being changing osteoporosis medicine to weekly rather than daily and to update her feeding instructions        The following portions of the patient's history were reviewed and updated as appropriate: allergies, current medications, past family history, past medical history, past social history, past surgical history and problem list     Review of Systems   Constitutional: Negative  HENT: Negative  Eyes: Negative  Respiratory: Negative  Cardiovascular: Negative  Objective:    Vitals:    02/14/18 0823   BP: 92/58   Pulse: 67   Resp: 16   SpO2: 95%        Physical Exam   Constitutional: She appears well-developed and well-nourished  Cardiovascular: Normal rate and regular rhythm  Pulmonary/Chest: Effort normal and breath sounds normal    Vitals reviewed

## 2018-03-08 NOTE — MISCELLANEOUS
Message  Please disregard Levothyroxine prescription dated 11/2/16 direction to take twice daily  Correct administration is Levothyroxine Sodium 50 mcg 1  once daily at 8 AM Per Dr Estelita Mckoy  1 Amended By: Lucho Crowell; Sep 06 2017 4:42 PM EST    Active Problems   1  Abrasion (919 0) (T14 8)  2  Atherosclerosis of arteries of extremities (440 20) (I70 209)  3  Behavior problems (V40 9)  4  BMI 27 0-27 9,adult (V85 23) (Z68 27)  5  Callus (700) (L84)  6  Cerumen debris on tympanic membrane of left ear (380 4) (H61 22)  7  Deformity of ankle and foot, acquired (736 70) (M21 969)  8  Depression (311) (F32 9)  9  Depression with anxiety (300 4) (F41 8)  10  Difficulty In Walking Involving An Ankle/Foot Joint (719 7)  11  Dysphagia (787 20) (R13 10)  12  Encounter for screening breast examination (V76 10) (Z12 31)  13  Encounter for screening colonoscopy (V76 51) (Z12 11)  14  Encounter for screening mammogram for malignant neoplasm of breast (V76 12)    (Z12 31)  15  Excessive cerumen in ear canal, bilateral (380 4) (H61 23)  16  Hyperlipidemia (272 4) (E78 5)  17  Hypothyroidism (244 9) (E03 9)  18  Nausea and vomiting (787 01) (R11 2)  19  Need for influenza vaccination (V04 81) (Z23)  20  Osteoporosis (733 00) (M81 0)  21  Pain in extremity (729 5) (M79 609)  22  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)  23  Trisomy 21, Down syndrome (758 0) (Q90 9)  24  Xerosis cutis (706 8) (L85 3)    Current Meds  1  Alendronate Sodium 10 MG Oral Tablet; TAKE 1 TABLET BY MOUTH MORNING (8AM)   -[facility]; Therapy: 81NVC3070 to (Evaluate:07Flq2233)  Requested for: 03Cqx0439; Last   Rx:04Cwf8145 Ordered  2  Daily Vitamin Plus Oral Capsule; TAKE 1 CAPSULE DAILY  Requested for: 17PTO8906; Last Rx:03Yth2252 Ordered  3  Daily Angella Oral Tablet; TAKE 1 TABLET BY MOUTH EVERY DAY (8AM) -[facility]; Therapy: 93JJN6880 to (Last Rx:73Pzz4153)  Requested for: 01Aug2016 Ordered  4   Debrox 6 5 % Otic Solution; INSTILL 4 DROP Twice daily at 7 am and 9pm Monday wednesday and Friday; Therapy: 48GMU2809 to (Evaluate:10Mar2017)  Requested for: 31ZTR3953; Last   Rx:88Lbs2269 Ordered  5  Levothyroxine Sodium 50 MCG Oral Tablet; TAKE 1 TABLET BY MOUTH EVERY DAY   (8AM) [facility]; Therapy: 53ZCY6966 to (Evaluate:45Bar6091)  Requested for: 28Mar2017; Last   Rx:28Mar2017 Ordered  6  LORazepam 0 5 MG Oral Tablet; TAKE 1 TABLET 3 TIMES DAILY AS NEEDED; Therapy: (Recorded:24Pvw0760) to Recorded  7  Luvox 25 MG TABS (FluvoxaMINE Maleate); Take 1 tablet twice daily; Therapy: (Recorded:56Nnm7949) to Recorded  8  Memantine HCl - 5 MG Oral Tablet; TAKE ONE TABLET BY MOUTH IN THE MORNING   (8AM) [facility]; Therapy: 92EBT0822 to (Evaluate:28Ndo5648)  Requested for: 20Hpq0453; Last   Rx:11Kwd2106 Ordered  9  Multi-Vitamins Oral Tablet; TAKE 1 TABLET BY MOUTH EVERY DAY (8AM) -[facility]; Therapy: 03UAH5810 to (Evaluate:40Dzw8540)  Requested for: 28Mar2017; Last   Rx:28Mar2017 Ordered  10  Namenda 5 MG Oral Tablet (Memantine HCl); Take 1 tablet by mouth once daily at 8    a m; Therapy: 54VAA3702 to ([facility])  Requested for: 58EPA5928; Last    HE:76TRL9212 Ordered  11  Simvastatin 20 MG Oral Tablet; TAKE 1 TABLET BY MOUTH EVERY DAY (8AM)    [facility]; Therapy: 87VYI7577 to (Evaluate:80Zkd2168)  Requested for: 35VZP3752; Last    Rx:46Bqr0104 Ordered  12  Vitamin D3 1000 UNIT Oral Tablet; TAKE 2 TABLETS (2000U) BY MOUTH EVERY DAY    AT 8PM (8PM) -[facility]; Therapy: 27EOM5507 to (Evaluate:18Vpg0884)  Requested for: 25Ifn3081; Last    Rx:19Pbf2027 Ordered    Allergies   1  No Known Drug Allergies   2  No Known Environmental Allergies  3   No Known Food Allergies    Signatures   Electronically signed by : Santi Whittaker LPN; Sep  6 5932  1:05UP EST                       (Author)

## 2018-03-08 NOTE — PROGRESS NOTES
Assessment    1  Hypothyroidism (244 9) (E03 9)   2  Encounter for preventive health examination (V70 0) (Z00 00)    Plan  Hyperlipidemia    · (1) LIPID PANEL FASTING W DIRECT LDL REFLEX; Status:Resulted - Requires  Verification;   Done: 30ZAE5432 07:02AM  Hyperlipidemia, Hypothyroidism    · (1) COMPREHENSIVE METABOLIC PANEL; Status:Resulted - Requires Verification;    Done: 90XEG0795 07:02AM  Hypothyroidism    · (1) TSH; Status:Resulted - Requires Verification;   Done: 33MMC4422 07:02AM    Discussion/Summary  health maintenance visit     Patient here for her yearly exam she has a GYN done elsewhere  Chief Complaint  patient here for well visit      History of Present Illness  HM, Adult Female: The patient is being seen for a health maintenance evaluation  General Health: The patient's health since the last visit is described as good   Hypothyroidism and hypercholesterolemia  She has regular dental visits  She denies vision problems  She denies hearing loss  Immunizations status: up to date  Lifestyle:  She does not exercise regularly  She does not use tobacco  She denies alcohol use  She denies drug use  Reproductive health: the patient is postmenopausal    Screening:      Review of Systems    Constitutional: No fever, no chills, feels well, no tiredness, no recent weight gain or weight loss  Eyes: No complaints of eye pain, no red eyes, no eyesight problems, no discharge, no dry eyes, no itching of eyes  ENT: no complaints of earache, no loss of hearing, no nose bleeds, no nasal discharge, no sore throat, no hoarseness  Cardiovascular: No complaints of slow heart rate, no fast heart rate, no chest pain, no palpitations, no leg claudication, no lower extremity edema  Respiratory: No complaints of shortness of breath, no wheezing, no cough, no SOB on exertion, no orthopnea, no PND     Gastrointestinal: No complaints of abdominal pain, no constipation, no nausea or vomiting, no diarrhea, no bloody stools  Active Problems     1  Behavior problems (V40 9)   2  BMI 27 0-27 9,adult (V85 23) (Z68 27)   3  Cerumen debris on tympanic membrane of left ear (380 4) (H61 22)   4  Depression (311) (F32 9)   5  Difficulty In Walking Involving An Ankle/Foot Joint (719 7)   6  Hyperlipidemia (272 4) (E78 5)   7  Hypothyroidism (244 9) (E03 9)   8  Need for influenza vaccination (V04 81) (Z23)   9  Osteoporosis (733 00) (M81 0)   10  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)   11  Trisomy 21, Down syndrome (758 0) (Q90 9)   12  Xerosis cutis (706 8) (L85 3)    Atherosclerosis of arteries of extremities (440 20) (I70 209)       Deformity of ankle and foot, acquired (736 70) (M21 969)       Pain in extremity (729 5) (M79 609)       Callus (700) (L84)          Past Medical History    · History of Depression with anxiety (300 4) (F41 8)   · History of Dermatomycosis (111 9) (B36 9)   · History of Encounter for screening breast examination (V76 10) (Z12 31)   · History of Encounter for screening colonoscopy (V76 51) (Z12 11)   · History of Excessive cerumen in ear canal, bilateral (380 4) (H61 23)   · History of Fissure of skin (709 8) (R23 4)   · History of Foot joint pain (719 47) (M25 579)   · History of abrasion (V15 59) (Z87 828)   · History of athlete's foot (V12 09) (Z86 19)   · History of Down syndrome (758 0) (Q90 9)   · History of dysphagia (V12 79) (Z87 19)   · History of hyperkalemia (V12 29) (Z86 39)   · History of nausea and vomiting (V12 79) (Z87 898)   · History of screening mammography (V15 89) (Z92 89)   · Hypothyroidism (244 9) (E03 9)   · History of Paronychia of toe, unspecified laterality (681 11) (L03 039)    Family History  Mother    · No pertinent family history  Family History    · Family history of No Significant Family History    Social History    · Never A Smoker   · Never A Smoker   · Never Drank Alcohol   · Never Used Drugs    Current Meds   1   Alendronate Sodium 10 MG Oral Tablet; TAKE 1 TABLET BY MOUTH MORNING (8AM)   -[facility]; Therapy: 66NYO2955 to (Evaluate:40Ite0518)  Requested for: 64Axc8526; Last   Rx:11Tvs8743 Ordered   2  Daily Vitamin Plus Oral Capsule; TAKE 1 CAPSULE DAILY  Requested for: 14HWL7977; Last Rx:28Yfo6657 Ordered   3  Daily Angella Oral Tablet; TAKE 1 TABLET BY MOUTH EVERY DAY (8AM) -[facility]; Therapy: 16TWQ6695 to (Last Rx:82Tij3167)  Requested for: 01Aug2016 Ordered   4  Debrox 6 5 % Otic Solution; INSTILL 4 DROP Twice daily at 7 am and 9pm Monday wednesday and Friday; Therapy: 75NJH0443 to (Evaluate:10Mar2017)  Requested for: 04GYX2708; Last   Rx:71Zrh2638 Ordered   5  Levothyroxine Sodium 50 MCG Oral Tablet; TAKE 1 TABLET BY MOUTH EVERY DAY   (8AM) -[facility]; Therapy: 81VDS7951 to (Evaluate:56Ciy9590)  Requested for: 28Mar2017; Last   Rx:28Mar2017 Ordered   6  LORazepam 0 5 MG Oral Tablet; TAKE 1 TABLET 3 TIMES DAILY AS NEEDED; Therapy: (Recorded:24Zec7492) to Recorded   7  Luvox 25 MG TABS; Take 1 tablet twice daily; Therapy: (Recorded:55Kup0863) to Recorded   8  Memantine HCl - 5 MG Oral Tablet; TAKE ONE TABLET BY MOUTH IN THE MORNING   (8AM) [facility]; Therapy: 27AWO4143 to (Evaluate:62Hyf2191)  Requested for: 28Zbw3227; Last   Rx:92Dzc4741 Ordered   9  Multi-Vitamins Oral Tablet; TAKE 1 TABLET BY MOUTH EVERY DAY (8AM) [facility]; Therapy: 16KRE5198 to (Evaluate:77Jsl0272)  Requested for: 28Mar2017; Last   Rx:28Mar2017 Ordered   10  Namenda 5 MG Oral Tablet; Take 1 tablet by mouth once daily at 8 a m; Therapy: 54XSY7234 to (Celestia Bosworth)  Requested for: 84HRZ7293; Last    SHANIKA:81SDY7927 Ordered   11  Simvastatin 20 MG Oral Tablet; TAKE 1 TABLET BY MOUTH EVERY DAY (8AM)    [facility]; Therapy: 75GOZ3531 to (Evaluate:47Djp9127)  Requested for: 07NDX4246; Last    Rx:27Kcy6064 Ordered   12  Vitamin D3 1000 UNIT Oral Tablet; TAKE 2 TABLETS (2000U) BY MOUTH EVERY DAY    AT 8PM (8PM) -[address] [place]en [place];     Therapy: 04KEJ4541 to (Evaluate:59Zsb6452) Requested for: 64Dvv6648; Last    Rx:66Mla6647 Ordered    Allergies    1  No Known Drug Allergies    2  No Known Environmental Allergies   3  No Known Food Allergies    Vitals   Recorded: 31Aug2017 01:08PM   Temperature 97 9 F   Heart Rate 72   Respiration 18   Systolic 90   Diastolic 58   Height 4 ft 3 75 in   Weight 103 lb    BMI Calculated 27 04   BSA Calculated 1 26   O2 Saturation 92     Physical Exam    Constitutional   General appearance: No acute distress, well appearing and well nourished  Head and Face   Head and face: Normal     Palpation of the face and sinuses: No sinus tenderness  Eyes   Conjunctiva and lids: No swelling, erythema or discharge  Pupils and irises: Equal, round, reactive to light  Ears, Nose, Mouth, and Throat   Otoscopic examination: Tympanic membranes translucent with normal light reflex  Canals patent without erythema  Nasal mucosa, septum, and turbinates: Normal without edema or erythema  Oropharynx: Normal with no erythema, edema, exudate or lesions  Pulmonary   Auscultation of lungs: Clear to auscultation  Cardiovascular   Auscultation of heart: Normal rate and rhythm, normal S1 and S2, no murmurs  Abdomen   Abdomen: Non-tender, no masses  Results/Data  (1) TSH 83Olh1609 07:02AM Brenton Velasquez Order Number: [id number]     Test Name Result Flag Reference   TSH 6 001 uIU/mL H 0 358-3 740   Patients undergoing fluorescein dye angiography may retain small amounts of fluorescein in the body for 48-72 hours post procedure  Samples containing fluorescein can produce falsely depressed TSH values  If the patient had this procedure,a specimen should be resubmitted post fluorescein clearance            The recommended reference ranges for TSH during pregnancy are as follows:  First trimester 0 1 to 2 5 uIU/mL  Second trimester  0 2 to 3 0 uIU/mL  Third trimester 0 3 to 3 0 uIU/m     (1) COMPREHENSIVE METABOLIC PANEL 26KCE1831 53:01MT Sue Dev Pritchett Order Number: [id number]     Test Name Result Flag Reference   SODIUM 140 mmol/L  136-145   POTASSIUM 4 4 mmol/L  3 5-5 3   CHLORIDE 102 mmol/L  100-108   CARBON DIOXIDE 31 mmol/L  21-32   ANION GAP (CALC) 7 mmol/L  4-13   BLOOD UREA NITROGEN 16 mg/dL  5-25   CREATININE 0 93 mg/dL  0 60-1 30   Standardized to IDMS reference method   CALCIUM 8 7 mg/dL  8 3-10 1   BILI, TOTAL 0 20 mg/dL  0 20-1 00   ALK PHOSPHATAS 66 U/L     ALT (SGPT) 46 U/L  12-78   Specimen collection should occur prior to Sulfasalazine administration due to the potential for falsely depressed results  AST(SGOT) 26 U/L  5-45   Specimen collection should occur prior to Sulfasalazine administration due to the potential for falsely depressed results  ALBUMIN 3 2 g/dL L 3 5-5 0   TOTAL PROTEIN 6 8 g/dL  6 4-8 2   eGFR 67 ml/min/1 73sq [facility] Disease Education Program recommendations are as follows:  GFR calculation is accurate only with a steady state creatinine  Chronic Kidney disease less than 60 ml/min/1 73 sq  meters  Kidney failure less than 15 ml/min/1 73 sq  meters  GLUCOSE FASTING 98 mg/dL  65-99   Specimen collection should occur prior to Sulfasalazine administration due to the potential for falsely depressed results  Specimen collection should occur prior to Sulfapyridine administration due to the potential for falsely elevated results  (1) LIPID PANEL FASTING W DIRECT LDL REFLEX 56Ijx4390 07:02AM Julito Lara Order Number: [id number]     Test Name Result Flag Reference   CHOLESTEROL 199 mg/dL     LDL CHOLESTEROL CALCULATED 94 mg/dL  0-100   Triglyceride:        Normal ??? ??? ??? ??? ??? ??? ??? <150 mg/dl   ??? ??? ???Borderline High ??? ??? 150-199 mg/dl   ??? ??? ? ?? High ??? ??? ??? ??? ??? ??? ??? 200-499 mg/dl   ??? ??? ? ??Very High ??? ??? ??? ??? ??? >499 mg/dl      Cholesterol:       Desirable ??? ??? ??? ??? <200 mg/dl   ??? ??? Borderline High ??? 200-239 mg/dl   ??? ??? High ??? ??? ??? ??? ??? ??? >239 mg/dl      HDL Cholesterol:       High ??? ???>59 mg/dL   ??? ??? Low ??? ??? <41 mg/dL      HDL Cholesterol:       High ??? ???>59 mg/dL   ??? ??? Low ??? ??? <41 mg/dL      This screening LDL is a calculated result  It does not have the accuracy of the Direct Measured LDL in the monitoring of patients with hyperlipidemia and/or statin therapy  Direct Measure LDL (KIQ911) must be ordered separately in these patients  TRIGLYCERIDES 168 mg/dL H <=150   Specimen collection should occur prior to N-Acetylcysteine or Metamizole administration due to the potential for falsely depressed results  HDL,DIRECT 71 mg/dL H 40-60   Specimen collection should occur prior to Metamizole administration due to the potential for falsley depressed results  Future Appointments    Date/Time Provider Specialty Site   01/09/2018 03:15 PM Alvina Domínguez DPM Podiatry [address] [facility] DPMIL[facility]    11/15/2017 09:00 AM ASHISH Lu  Family Medicine [facility] PRACTICE     Signatures   Electronically signed by :  ASHISH Bro ; Oct 12 2017  9:43AM EST                       (Author)

## 2018-03-16 ENCOUNTER — OFFICE VISIT (OUTPATIENT)
Dept: PODIATRY | Facility: CLINIC | Age: 63
End: 2018-03-16
Payer: MEDICARE

## 2018-03-16 VITALS — HEIGHT: 55 IN | WEIGHT: 106 LBS | BODY MASS INDEX: 24.53 KG/M2

## 2018-03-16 DIAGNOSIS — L84 CALLUS: ICD-10-CM

## 2018-03-16 DIAGNOSIS — I70.209 ATHEROSCLEROSIS OF ARTERIES OF EXTREMITIES (HCC): Primary | ICD-10-CM

## 2018-03-16 DIAGNOSIS — M79.672 PAIN IN BOTH FEET: ICD-10-CM

## 2018-03-16 DIAGNOSIS — M21.969 ACQUIRED DEFORMITY OF ANKLE AND FOOT, UNSPECIFIED LATERALITY: ICD-10-CM

## 2018-03-16 DIAGNOSIS — M79.671 PAIN IN BOTH FEET: ICD-10-CM

## 2018-03-16 PROCEDURE — 11056 PARNG/CUTG B9 HYPRKR LES 2-4: CPT | Performed by: PODIATRIST

## 2018-03-16 NOTE — PROGRESS NOTES
Assessment/Plan:     Procedure: Debridement of hyperkeratosis  The procedure's risks and infection risk were discussed with the patient  Procedure Note:     Anesthesia: Aseptic debridement #10 scalpel  The area for debridement was located on the First metatarsal head  Fifth metatarsal head bilateral  Debridement was performed on partial thickness hyperkeratotic area(s)  Post-Procedure: the patient tolerated the procedure well  Complications: there were no complications  Procedure: toenail debridement performed  Indications for the procedure include professional performance of nail care required due to peripheral arterial disease-- and-- physical limitations  The procedure's risks and infection risk were discussed with the patient and with the guardian  Procedure Note: The toenails were debrided using heavy-duty nail nippers,-- with a nail ,-- the nail edges were smoothed-- and-- the hyperkeratotic material was grinded  Post-Procedure: the patient tolerated the procedure well  Complications: there were no complications  Aseptic debridement and planing of nails x10, manually, and mechanically debridement of pretrophic ulceration hyperkeratotic lesion first metatarsal head and hallux IPJ bilateral with #10 scalpel debrided nonviable tissue down to healthy viable tissue     Daily foot checks monitor for signs of infection follow-up 3 months     Diagnoses and all orders for this visit:    Atherosclerosis of arteries of extremities (HCC)    Callus    Acquired deformity of ankle and foot, unspecified laterality    Pain in both feet          Subjective:      Patient ID: Viridiana Way is a 63 y o  female      Patient has painful calluses and thick painful nails that hurt when walking wearing shoes        The following portions of the patient's history were reviewed and updated as appropriate: allergies, current medications, past family history, past medical history, past social history, past surgical history and problem list     Review of Systems   Constitutional: Negative  HENT: Negative  Eyes: Negative  Respiratory: Negative  Cardiovascular: Negative  Gastrointestinal: Negative  Endocrine: Negative  Genitourinary: Negative  Musculoskeletal: Negative  Skin: Negative  Allergic/Immunologic: Negative  Neurological: Negative  Hematological: Negative  Psychiatric/Behavioral: Negative  Objective:      Ht 4' 3 75" (1 314 m)   Wt 48 1 kg (106 lb)   BMI 27 83 kg/m²          Physical Exam    Constitutional: no acute distress, well appearing and well nourished  Cardiovascular: abnormal dorsalis pedis pulse,-- abnormal posterior tibialis pulse,-- dependence rubor-- and-- abnormal capillary refill  Orthopedic/Biomechanical: abnormal foot type,-- hammertoe(s),-- abnormal MPJ ROM,-- abnormal midtarsal joint ROM,-- abnormal subtalar joint ROM,-- decreased strength with dorsiflexion,-- decreased strength with plantarflexion,-- discolored nails,-- subungual debris-- and-- nail tenderness  Skin: abnormal texture,-- decreased skin turgor-- and-- keratoses  Left Foot: Appearance: Normal except as noted: a deformity-- and-- pes planus  Great toe deformities include a bunion  Second toe deformities include hammer toe  Third toe deformities include hammer toe  Forth toe deformities include hammer toe  Fifth toe deformities include hammer toe  Significant deformity of, bilateral feet, hammertoes, contracted, MPJs plantarflexed first met head  Special Tests: pre-ulcerative hyperkeratotic lesions plantar first metatarsal head bilateral right worse than left  Significant bunion deformity rigidly contracted hammertoes bilateral     Right Foot: Appearance: a deformity-- and-- pes planus  Great toe deformities include a bunion  Negative erythema  moderate edema mild hyperpigmentation lower legs bilateral  Negative calf tenderness   No signs of infection  Special Tests: All nails thick discolored dystrophic, positive subungual debris, positive pain on palpation  Neurological Exam: performed  Light touch was decreased bilaterally  Vibratory sensation was decreased in both first metatarsophalangeal joints  Response to monofilament test was intact bilaterally  Vascular Exam: Dorsalis pedis pulses were absent bilaterally  Posterior tibial pulses were absent bilaterally  Dependence rubor was present bilaterally  Capillary refill time was greater than 3 seconds bilaterally  Edema: mild bilaterally  Toenails: All of the toenails were elongated,-- hypertrophied,-- discolored,-- shown to have subungual debris-- and-- tender  Hyperkeratosis: present on both first sub metatarsals-- and-- present on both fifth sub metatarsals  Shoe Gear Evaluation: performed ()  Right Foot: width: d-- and-- length: 5  Left Foot: width: d-- and-- length: 5  Recommendation(s): athletic shoes  Moderate xerosis plantar feet bilateral  Significant bunion bilateral   Negative erythema no signs of infection    Skin is hairless and atrophic vascular changes noted bilateral  Plus one edema bilateral

## 2018-03-23 ENCOUNTER — TELEPHONE (OUTPATIENT)
Dept: FAMILY MEDICINE CLINIC | Facility: CLINIC | Age: 63
End: 2018-03-23

## 2018-03-23 DIAGNOSIS — E03.9 ACQUIRED HYPOTHYROIDISM: Primary | ICD-10-CM

## 2018-04-02 ENCOUNTER — TRANSCRIBE ORDERS (OUTPATIENT)
Dept: ADMINISTRATIVE | Facility: HOSPITAL | Age: 63
End: 2018-04-02

## 2018-04-02 ENCOUNTER — APPOINTMENT (OUTPATIENT)
Dept: LAB | Facility: HOSPITAL | Age: 63
End: 2018-04-02
Attending: FAMILY MEDICINE
Payer: MEDICARE

## 2018-04-02 DIAGNOSIS — E03.9 ACQUIRED HYPOTHYROIDISM: ICD-10-CM

## 2018-04-02 LAB — TSH SERPL DL<=0.05 MIU/L-ACNC: 58.26 UIU/ML

## 2018-04-02 PROCEDURE — 84443 ASSAY THYROID STIM HORMONE: CPT

## 2018-04-02 PROCEDURE — 36415 COLL VENOUS BLD VENIPUNCTURE: CPT

## 2018-04-03 ENCOUNTER — TELEPHONE (OUTPATIENT)
Dept: FAMILY MEDICINE CLINIC | Facility: CLINIC | Age: 63
End: 2018-04-03

## 2018-04-03 DIAGNOSIS — E03.9 ACQUIRED HYPOTHYROIDISM: Primary | ICD-10-CM

## 2018-04-03 RX ORDER — LEVOTHYROXINE SODIUM 0.05 MG/1
50 TABLET ORAL DAILY
Qty: 60 TABLET | Refills: 0 | Status: SHIPPED | OUTPATIENT
Start: 2018-04-03 | End: 2018-05-16 | Stop reason: SDUPTHER

## 2018-04-04 DIAGNOSIS — R79.89 ABNORMAL TSH: Primary | ICD-10-CM

## 2018-04-04 NOTE — TELEPHONE ENCOUNTER
They will be faxing form for us to fill out and send back with new dose directions and the lab slip for the repeat TSH

## 2018-04-11 ENCOUNTER — TRANSCRIBE ORDERS (OUTPATIENT)
Dept: ADMINISTRATIVE | Facility: HOSPITAL | Age: 63
End: 2018-04-11

## 2018-04-11 DIAGNOSIS — N95.1 MENOPAUSAL STATE: ICD-10-CM

## 2018-04-11 DIAGNOSIS — Z12.39 SCREENING BREAST EXAMINATION: Primary | ICD-10-CM

## 2018-04-13 ENCOUNTER — HOSPITAL ENCOUNTER (OUTPATIENT)
Dept: RADIOLOGY | Facility: HOSPITAL | Age: 63
Discharge: HOME | End: 2018-04-13
Attending: OBSTETRICS & GYNECOLOGY
Payer: MEDICARE

## 2018-04-13 DIAGNOSIS — N95.1 MENOPAUSAL STATE: ICD-10-CM

## 2018-04-13 PROCEDURE — 77080 DXA BONE DENSITY AXIAL: CPT

## 2018-04-24 DIAGNOSIS — M81.0 AGE-RELATED OSTEOPOROSIS WITHOUT CURRENT PATHOLOGICAL FRACTURE: Primary | ICD-10-CM

## 2018-04-24 DIAGNOSIS — E03.9 ACQUIRED HYPOTHYROIDISM: ICD-10-CM

## 2018-04-24 DIAGNOSIS — E78.2 MIXED HYPERLIPIDEMIA: Primary | ICD-10-CM

## 2018-04-26 RX ORDER — ALENDRONATE SODIUM 70 MG/1
TABLET ORAL
Qty: 1 TABLET | Refills: 4 | Status: SHIPPED | OUTPATIENT
Start: 2018-04-26 | End: 2018-09-06 | Stop reason: SDUPTHER

## 2018-05-10 ENCOUNTER — TRANSCRIBE ORDERS (OUTPATIENT)
Dept: ADMINISTRATIVE | Facility: HOSPITAL | Age: 63
End: 2018-05-10

## 2018-05-10 ENCOUNTER — APPOINTMENT (OUTPATIENT)
Dept: LAB | Facility: HOSPITAL | Age: 63
End: 2018-05-10
Attending: FAMILY MEDICINE
Payer: MEDICARE

## 2018-05-10 DIAGNOSIS — Z79.899 HIGH RISK MEDICATION USE: ICD-10-CM

## 2018-05-10 DIAGNOSIS — F42.2 MIXED OBSESSIONAL THOUGHTS AND ACTS: Primary | ICD-10-CM

## 2018-05-10 DIAGNOSIS — R79.89 ABNORMAL TSH: ICD-10-CM

## 2018-05-10 DIAGNOSIS — F42.2 MIXED OBSESSIONAL THOUGHTS AND ACTS: ICD-10-CM

## 2018-05-10 LAB
25(OH)D3 SERPL-MCNC: 36.3 NG/ML (ref 30–100)
ALBUMIN SERPL BCP-MCNC: 3.3 G/DL (ref 3.5–5)
ALP SERPL-CCNC: 60 U/L (ref 46–116)
ALT SERPL W P-5'-P-CCNC: 52 U/L (ref 12–78)
ANION GAP SERPL CALCULATED.3IONS-SCNC: 3 MMOL/L (ref 4–13)
AST SERPL W P-5'-P-CCNC: 24 U/L (ref 5–45)
BASOPHILS # BLD AUTO: 0 THOUSANDS/ΜL (ref 0–0.1)
BASOPHILS NFR BLD AUTO: 1 % (ref 0–1)
BILIRUB SERPL-MCNC: 0.3 MG/DL (ref 0.2–1)
BUN SERPL-MCNC: 20 MG/DL (ref 5–25)
CALCIUM SERPL-MCNC: 8.8 MG/DL (ref 8.3–10.1)
CHLORIDE SERPL-SCNC: 105 MMOL/L (ref 100–108)
CHOLEST SERPL-MCNC: 185 MG/DL (ref 50–200)
CO2 SERPL-SCNC: 33 MMOL/L (ref 21–32)
CREAT SERPL-MCNC: 0.97 MG/DL (ref 0.6–1.3)
EOSINOPHIL # BLD AUTO: 0 THOUSAND/ΜL (ref 0–0.61)
EOSINOPHIL NFR BLD AUTO: 1 % (ref 0–6)
ERYTHROCYTE [DISTWIDTH] IN BLOOD BY AUTOMATED COUNT: 16.7 % (ref 11.6–15.1)
GFR SERPL CREATININE-BSD FRML MDRD: 64 ML/MIN/1.73SQ M
GLUCOSE P FAST SERPL-MCNC: 121 MG/DL (ref 65–99)
HCT VFR BLD AUTO: 47.6 % (ref 37–47)
HDLC SERPL-MCNC: 61 MG/DL (ref 40–60)
HGB BLD-MCNC: 15.4 G/DL (ref 12–16)
LDLC SERPL CALC-MCNC: 97 MG/DL (ref 0–100)
LYMPHOCYTES # BLD AUTO: 1.1 THOUSANDS/ΜL (ref 0.6–4.47)
LYMPHOCYTES NFR BLD AUTO: 21 % (ref 14–44)
MCH RBC QN AUTO: 26.7 PG (ref 27–31)
MCHC RBC AUTO-ENTMCNC: 32.4 G/DL (ref 31.4–37.4)
MCV RBC AUTO: 82 FL (ref 82–98)
MONOCYTES # BLD AUTO: 0.5 THOUSAND/ΜL (ref 0.17–1.22)
MONOCYTES NFR BLD AUTO: 9 % (ref 4–12)
NEUTROPHILS # BLD AUTO: 3.7 THOUSANDS/ΜL (ref 1.85–7.62)
NEUTS SEG NFR BLD AUTO: 69 % (ref 43–75)
NONHDLC SERPL-MCNC: 124 MG/DL
NRBC BLD AUTO-RTO: 0 /100 WBCS
PLATELET # BLD AUTO: 238 THOUSANDS/UL (ref 130–400)
PMV BLD AUTO: 9.4 FL (ref 8.9–12.7)
POTASSIUM SERPL-SCNC: 4.5 MMOL/L (ref 3.5–5.3)
PROT SERPL-MCNC: 7.1 G/DL (ref 6.4–8.2)
RBC # BLD AUTO: 5.79 MILLION/UL (ref 4.2–5.4)
SODIUM SERPL-SCNC: 141 MMOL/L (ref 136–145)
TRIGL SERPL-MCNC: 133 MG/DL
TSH SERPL DL<=0.05 MIU/L-ACNC: 10.23 UIU/ML
WBC # BLD AUTO: 5.4 THOUSAND/UL (ref 4.8–10.8)

## 2018-05-10 PROCEDURE — 80053 COMPREHEN METABOLIC PANEL: CPT | Performed by: NURSE PRACTITIONER

## 2018-05-10 PROCEDURE — 82306 VITAMIN D 25 HYDROXY: CPT

## 2018-05-10 PROCEDURE — 84443 ASSAY THYROID STIM HORMONE: CPT

## 2018-05-10 PROCEDURE — 80061 LIPID PANEL: CPT

## 2018-05-10 PROCEDURE — 85025 COMPLETE CBC W/AUTO DIFF WBC: CPT | Performed by: NURSE PRACTITIONER

## 2018-05-10 PROCEDURE — 36415 COLL VENOUS BLD VENIPUNCTURE: CPT

## 2018-05-12 RX ORDER — SIMVASTATIN 20 MG
TABLET ORAL
Qty: 30 TABLET | Refills: 4 | Status: SHIPPED | OUTPATIENT
Start: 2018-05-12 | End: 2018-05-16 | Stop reason: SDUPTHER

## 2018-05-13 DIAGNOSIS — Z00.00 HEALTH CARE MAINTENANCE: Primary | ICD-10-CM

## 2018-05-14 RX ORDER — DIPHENOXYLATE HYDROCHLORIDE AND ATROPINE SULFATE 2.5; .025 MG/1; MG/1
TABLET ORAL
Qty: 30 TABLET | Refills: 10 | Status: SHIPPED | OUTPATIENT
Start: 2018-05-14 | End: 2018-09-06 | Stop reason: SDUPTHER

## 2018-05-16 ENCOUNTER — TELEPHONE (OUTPATIENT)
Dept: FAMILY MEDICINE CLINIC | Facility: CLINIC | Age: 63
End: 2018-05-16

## 2018-05-16 RX ORDER — LEVOTHYROXINE SODIUM 0.07 MG/1
75 TABLET ORAL DAILY
Qty: 60 TABLET | Refills: 0 | Status: SHIPPED | OUTPATIENT
Start: 2018-05-16 | End: 2018-09-06 | Stop reason: DRUGHIGH

## 2018-05-16 RX ORDER — SIMVASTATIN 20 MG
TABLET ORAL
Qty: 90 TABLET | Refills: 3 | Status: SHIPPED | OUTPATIENT
Start: 2018-05-16 | End: 2018-09-06 | Stop reason: SDUPTHER
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
## 2018-05-23 DIAGNOSIS — R79.89 ABNORMAL TSH: Primary | ICD-10-CM

## 2018-06-08 ENCOUNTER — OFFICE VISIT (OUTPATIENT)
Dept: PODIATRY | Facility: CLINIC | Age: 63
End: 2018-06-08
Payer: MEDICARE

## 2018-06-08 VITALS — BODY MASS INDEX: 24.53 KG/M2 | HEIGHT: 55 IN | WEIGHT: 106 LBS

## 2018-06-08 DIAGNOSIS — L84 CALLUS: ICD-10-CM

## 2018-06-08 DIAGNOSIS — I70.209 ATHEROSCLEROSIS OF ARTERIES OF EXTREMITIES (HCC): Primary | ICD-10-CM

## 2018-06-08 DIAGNOSIS — M21.962 DEFORMITY OF BOTH FEET: ICD-10-CM

## 2018-06-08 DIAGNOSIS — M21.961 DEFORMITY OF BOTH FEET: ICD-10-CM

## 2018-06-08 DIAGNOSIS — M79.671 PAIN IN BOTH FEET: ICD-10-CM

## 2018-06-08 DIAGNOSIS — M79.672 PAIN IN BOTH FEET: ICD-10-CM

## 2018-06-08 PROCEDURE — 11056 PARNG/CUTG B9 HYPRKR LES 2-4: CPT | Performed by: PODIATRIST

## 2018-06-08 NOTE — PROGRESS NOTES
Assessment/Plan:     Procedure: Debridement of hyperkeratosis  The procedure's risks and infection risk were discussed with the patient  Procedure Note:     Anesthesia: Aseptic debridement #10 scalpel  The area for debridement was located on the First metatarsal head  Fifth metatarsal head bilateral  Debridement was performed on partial thickness hyperkeratotic area(s)  Post-Procedure: the patient tolerated the procedure well  Complications: there were no complications  Procedure: toenail debridement performed  Indications for the procedure include professional performance of nail care required due to peripheral arterial disease-- and-- physical limitations  The procedure's risks and infection risk were discussed with the patient and with the guardian  Procedure Note: The toenails were debrided using heavy-duty nail nippers,-- with a nail ,-- the nail edges were smoothed-- and-- the hyperkeratotic material was grinded  Post-Procedure: the patient tolerated the procedure well  Complications: there were no complications  Aseptic debridement and planing of nails x10, manually, and mechanically debridement of pretrophic ulceration hyperkeratotic lesion first metatarsal head and hallux IPJ bilateral with #10 scalpel debrided nonviable tissue down to healthy viable tissue     Daily foot checks monitor for signs of infection follow-up 3 months     Diagnoses and all orders for this visit:    Atherosclerosis of arteries of extremities (HCC)    Callus    Pain in both feet    Deformity of both feet          Subjective:      Patient ID: Catheline Dance is a 63 y o  female      Patient has painful calluses and thick painful nails that hurt when walking wearing shoes        The following portions of the patient's history were reviewed and updated as appropriate: allergies, current medications, past family history, past medical history, past social history, past surgical history and problem list     Review of Systems   Constitutional: Negative  HENT: Negative  Eyes: Negative  Respiratory: Negative  Cardiovascular: Negative  Gastrointestinal: Negative  Endocrine: Negative  Genitourinary: Negative  Musculoskeletal: Negative  Skin: Negative  Allergic/Immunologic: Negative  Neurological: Negative  Hematological: Negative  Psychiatric/Behavioral: Negative  Objective:      Ht 4' 3 75" (1 314 m)   Wt 48 1 kg (106 lb)   BMI 27 83 kg/m²          Physical Exam    Constitutional: no acute distress, well appearing and well nourished  Cardiovascular: abnormal dorsalis pedis pulse,-- abnormal posterior tibialis pulse,-- dependence rubor-- and-- abnormal capillary refill  Orthopedic/Biomechanical: abnormal foot type,-- hammertoe(s),-- abnormal MPJ ROM,-- abnormal midtarsal joint ROM,-- abnormal subtalar joint ROM,-- decreased strength with dorsiflexion,-- decreased strength with plantarflexion,-- discolored nails,-- subungual debris-- and-- nail tenderness  Skin: abnormal texture,-- decreased skin turgor-- and-- keratoses  Left Foot: Appearance: Normal except as noted: a deformity-- and-- pes planus  Great toe deformities include a bunion  Second toe deformities include hammer toe  Third toe deformities include hammer toe  Forth toe deformities include hammer toe  Fifth toe deformities include hammer toe  Significant deformity of, bilateral feet, hammertoes, contracted, MPJs plantarflexed first met head  Special Tests: pre-ulcerative hyperkeratotic lesions plantar first metatarsal head bilateral right worse than left  Significant bunion deformity rigidly contracted hammertoes bilateral     Right Foot: Appearance: a deformity-- and-- pes planus  Great toe deformities include a bunion  Negative erythema  moderate edema mild hyperpigmentation lower legs bilateral  Negative calf tenderness  No signs of infection  Special Tests:  All nails thick discolored dystrophic, positive subungual debris, positive pain on palpation  Neurological Exam: performed  Light touch was decreased bilaterally  Vibratory sensation was decreased in both first metatarsophalangeal joints  Response to monofilament test was intact bilaterally  Vascular Exam: Dorsalis pedis pulses were absent bilaterally  Posterior tibial pulses were absent bilaterally  Dependence rubor was present bilaterally  Capillary refill time was greater than 3 seconds bilaterally  Edema: mild bilaterally  Toenails: All of the toenails were elongated,-- hypertrophied,-- discolored,-- shown to have subungual debris-- and-- tender  Hyperkeratosis: present on both first sub metatarsals-- and-- present on both fifth sub metatarsals  Shoe Gear Evaluation: performed ()  Right Foot: width: d-- and-- length: 5  Left Foot: width: d-- and-- length: 5  Recommendation(s): athletic shoes    Significant bunion bilateral   Negative erythema no signs of infection  Skin is hairless and atrophic vascular changes noted bilateral  Plus one edema bilateral   Moderate venous stasis, moderate xerosis    Negative erythema negative ulceration negative maceration no signs of infection

## 2018-06-14 ENCOUNTER — OFFICE VISIT (OUTPATIENT)
Dept: FAMILY MEDICINE CLINIC | Facility: CLINIC | Age: 63
End: 2018-06-14
Payer: MEDICARE

## 2018-06-14 ENCOUNTER — TELEPHONE (OUTPATIENT)
Dept: FAMILY MEDICINE CLINIC | Facility: CLINIC | Age: 63
End: 2018-06-14

## 2018-06-14 VITALS
BODY MASS INDEX: 28.09 KG/M2 | WEIGHT: 107 LBS | RESPIRATION RATE: 18 BRPM | OXYGEN SATURATION: 94 % | SYSTOLIC BLOOD PRESSURE: 106 MMHG | HEART RATE: 75 BPM | DIASTOLIC BLOOD PRESSURE: 68 MMHG

## 2018-06-14 DIAGNOSIS — E03.9 ACQUIRED HYPOTHYROIDISM: Primary | ICD-10-CM

## 2018-06-14 PROCEDURE — 99213 OFFICE O/P EST LOW 20 MIN: CPT | Performed by: FAMILY MEDICINE

## 2018-06-14 RX ORDER — LEVOTHYROXINE SODIUM 88 UG/1
88 TABLET ORAL DAILY
Qty: 60 TABLET | Refills: 3 | Status: SHIPPED | OUTPATIENT
Start: 2018-06-14 | End: 2018-09-06

## 2018-06-14 NOTE — TELEPHONE ENCOUNTER
Trying to obtain prior authorization for brand name synthroid , patient's zip code and phone number has to be updated with [employer] for me to submit prior auth , need care taker to call [employer] so prior auth can be done

## 2018-06-18 NOTE — PROGRESS NOTES
Assessment/Plan:    No problem-specific Assessment & Plan notes found for this encounter  Diagnoses and all orders for this visit:    Acquired hypothyroidism  -     levothyroxine 88 mcg tablet; Take 1 tablet (88 mcg total) by mouth daily  -     TSH, 3rd generation; Future          Subjective:      Patient ID: Roma Garcias is a 66 y o  female  Patient is here with her caretaker her recent TSH is better but still needs slight tweaking of control  We discussed maybe trying get the brand medically necessary Synthroid since Levoxyl seems to not really provide much stability of her TSH        The following portions of the patient's history were reviewed and updated as appropriate: current medications, past family history, past medical history, past social history, past surgical history and problem list     Review of Systems   Constitutional: Negative  HENT: Negative  Respiratory: Negative  Cardiovascular: Negative  Gastrointestinal: Negative  Endocrine: Negative  Objective:      /68   Pulse 75   Resp 18   Wt 48 5 kg (107 lb)   SpO2 94%   BMI 28 09 kg/m²          Physical Exam   Constitutional: She appears well-developed and well-nourished  HENT:   Head: Normocephalic and atraumatic  Cardiovascular: Normal rate and regular rhythm      Pulmonary/Chest: Effort normal and breath sounds normal

## 2018-06-29 NOTE — TELEPHONE ENCOUNTER
Prior authorization approved for synthroid brand name , [facility] pharmacy aware and will get rx ready

## 2018-07-10 ENCOUNTER — HOSPITAL ENCOUNTER (OUTPATIENT)
Dept: RADIOLOGY | Facility: HOSPITAL | Age: 63
Discharge: HOME | End: 2018-07-10
Attending: OBSTETRICS & GYNECOLOGY
Payer: MEDICARE

## 2018-07-10 DIAGNOSIS — Z12.39 SCREENING BREAST EXAMINATION: ICD-10-CM

## 2018-07-10 PROCEDURE — 77067 SCR MAMMO BI INCL CAD: CPT

## 2018-07-27 DIAGNOSIS — E55.9 VITAMIN D DEFICIENCY: Primary | ICD-10-CM

## 2018-07-31 ENCOUNTER — TRANSCRIBE ORDERS (OUTPATIENT)
Dept: ADMINISTRATIVE | Facility: HOSPITAL | Age: 63
End: 2018-07-31

## 2018-07-31 ENCOUNTER — APPOINTMENT (OUTPATIENT)
Dept: LAB | Facility: HOSPITAL | Age: 63
End: 2018-07-31
Attending: FAMILY MEDICINE
Payer: MEDICARE

## 2018-07-31 DIAGNOSIS — E03.9 ACQUIRED HYPOTHYROIDISM: ICD-10-CM

## 2018-07-31 LAB — TSH SERPL DL<=0.05 MIU/L-ACNC: 0.02 UIU/ML (ref 0.36–3.74)

## 2018-07-31 PROCEDURE — 36415 COLL VENOUS BLD VENIPUNCTURE: CPT

## 2018-07-31 PROCEDURE — 84443 ASSAY THYROID STIM HORMONE: CPT

## 2018-08-22 ENCOUNTER — TELEPHONE (OUTPATIENT)
Dept: FAMILY MEDICINE CLINIC | Facility: CLINIC | Age: 63
End: 2018-08-22

## 2018-08-22 NOTE — TELEPHONE ENCOUNTER
SHE WANTS TO FAX SOMETHING TO THE NURSES, BUT SHE NEEDS TO SPEAK TO SOMEONE FIRST  SHE WOULD LIKE CALL BACK

## 2018-08-23 DIAGNOSIS — F03.91 DEMENTIA WITH BEHAVIORAL DISTURBANCE, UNSPECIFIED DEMENTIA TYPE (HCC): Primary | ICD-10-CM

## 2018-08-28 RX ORDER — MEMANTINE HYDROCHLORIDE 5 MG/1
TABLET ORAL
Qty: 30 TABLET | Refills: 4 | Status: SHIPPED | OUTPATIENT
Start: 2018-08-28 | End: 2018-09-06 | Stop reason: SDUPTHER

## 2018-09-06 ENCOUNTER — OFFICE VISIT (OUTPATIENT)
Dept: FAMILY MEDICINE CLINIC | Facility: CLINIC | Age: 63
End: 2018-09-06
Payer: MEDICARE

## 2018-09-06 VITALS
HEIGHT: 55 IN | DIASTOLIC BLOOD PRESSURE: 66 MMHG | OXYGEN SATURATION: 94 % | WEIGHT: 104.25 LBS | BODY MASS INDEX: 24.13 KG/M2 | RESPIRATION RATE: 18 BRPM | SYSTOLIC BLOOD PRESSURE: 98 MMHG | TEMPERATURE: 97.5 F | HEART RATE: 66 BPM

## 2018-09-06 DIAGNOSIS — F03.91 DEMENTIA WITH BEHAVIORAL DISTURBANCE, UNSPECIFIED DEMENTIA TYPE (HCC): ICD-10-CM

## 2018-09-06 DIAGNOSIS — E03.9 ACQUIRED HYPOTHYROIDISM: Primary | ICD-10-CM

## 2018-09-06 DIAGNOSIS — E78.2 MIXED HYPERLIPIDEMIA: ICD-10-CM

## 2018-09-06 DIAGNOSIS — Z00.00 HEALTH CARE MAINTENANCE: ICD-10-CM

## 2018-09-06 DIAGNOSIS — M81.0 AGE-RELATED OSTEOPOROSIS WITHOUT CURRENT PATHOLOGICAL FRACTURE: ICD-10-CM

## 2018-09-06 DIAGNOSIS — E55.9 VITAMIN D DEFICIENCY: ICD-10-CM

## 2018-09-06 DIAGNOSIS — H61.23 EXCESSIVE CERUMEN IN BOTH EAR CANALS: ICD-10-CM

## 2018-09-06 PROCEDURE — 99396 PREV VISIT EST AGE 40-64: CPT | Performed by: FAMILY MEDICINE

## 2018-09-06 RX ORDER — MEMANTINE HYDROCHLORIDE 5 MG/1
5 TABLET ORAL DAILY
Qty: 90 TABLET | Refills: 3 | Status: SHIPPED | OUTPATIENT
Start: 2018-09-06

## 2018-09-06 RX ORDER — LEVOTHYROXINE SODIUM 88 UG/1
88 TABLET ORAL DAILY
Qty: 90 TABLET | Refills: 3 | Status: SHIPPED | OUTPATIENT
Start: 2018-09-06 | End: 2018-10-08 | Stop reason: ALTCHOICE

## 2018-09-06 RX ORDER — DIPHENOXYLATE HYDROCHLORIDE AND ATROPINE SULFATE 2.5; .025 MG/1; MG/1
1 TABLET ORAL DAILY
Qty: 90 TABLET | Refills: 3 | Status: SHIPPED | OUTPATIENT
Start: 2018-09-06

## 2018-09-06 RX ORDER — ALENDRONATE SODIUM 70 MG/1
70 TABLET ORAL
Qty: 12 TABLET | Refills: 3 | Status: SHIPPED | OUTPATIENT
Start: 2018-09-06

## 2018-09-06 RX ORDER — SIMVASTATIN 20 MG
TABLET ORAL
Qty: 90 TABLET | Refills: 0 | Status: SHIPPED | OUTPATIENT
Start: 2018-09-06 | End: 2019-04-24 | Stop reason: SDUPTHER

## 2018-09-07 NOTE — PROGRESS NOTES
Assessment/Plan:    No problem-specific Assessment & Plan notes found for this encounter  Diagnoses and all orders for this visit:    Acquired hypothyroidism  -     TSH baseline; Future  -     TSH baseline; Future  -     levothyroxine 88 mcg tablet; Take 1 tablet (88 mcg total) by mouth daily Brand approved    Dementia with behavioral disturbance, unspecified dementia type  -     memantine (NAMENDA) 5 mg tablet; Take 1 tablet (5 mg total) by mouth daily    Health care maintenance  -     multivitamin (THERAGRAN) TABS; Take 1 tablet by mouth daily    Mixed hyperlipidemia  -     simvastatin (ZOCOR) 20 mg tablet; 1 po daily    Age-related osteoporosis without current pathological fracture  -     alendronate (FOSAMAX) 70 mg tablet; Take 1 tablet (70 mg total) by mouth every 7 days    Vitamin D deficiency  -     cholecalciferol (VITAMIN D3) 1,000 units tablet; Take 1 tablet (1,000 Units total) by mouth 2 (two) times a day    Excessive cerumen in both ear canals  -     carbamide peroxide (DEBROX) 6 5 % otic solution; Administer 5 drops into both ears 2 (two) times a day To use 1 week out of each month          Subjective:      Patient ID: Toni Mann is a 66 y o  female  Erika is here for her yearly physical there are no issues from her caretakers point of view           The following portions of the patient's history were reviewed and updated as appropriate: allergies, current medications, past family history, past medical history, past social history, past surgical history and problem list     Review of Systems      Objective:      BP 98/66 (BP Location: Left arm, Patient Position: Sitting)   Pulse 66   Temp 97 5 °F (36 4 °C) (Tympanic)   Resp 18   Ht 4' 3 75" (1 314 m)   Wt 47 3 kg (104 lb 4 oz)   SpO2 94%   BMI 27 37 kg/m²          Physical Exam

## 2018-09-10 ENCOUNTER — APPOINTMENT (OUTPATIENT)
Dept: LAB | Facility: HOSPITAL | Age: 63
End: 2018-09-10
Attending: FAMILY MEDICINE
Payer: MEDICARE

## 2018-09-10 ENCOUNTER — TRANSCRIBE ORDERS (OUTPATIENT)
Dept: ADMINISTRATIVE | Facility: HOSPITAL | Age: 63
End: 2018-09-10

## 2018-09-10 ENCOUNTER — CLINICAL SUPPORT (OUTPATIENT)
Dept: FAMILY MEDICINE CLINIC | Facility: CLINIC | Age: 63
End: 2018-09-10
Payer: MEDICARE

## 2018-09-10 DIAGNOSIS — Z11.1 SCREENING FOR TUBERCULOSIS: Primary | ICD-10-CM

## 2018-09-10 DIAGNOSIS — E03.9 ACQUIRED HYPOTHYROIDISM: ICD-10-CM

## 2018-09-10 LAB — TSH SERPL DL<=0.05 MIU/L-ACNC: 0.02 UIU/ML

## 2018-09-10 PROCEDURE — 86580 TB INTRADERMAL TEST: CPT

## 2018-09-10 PROCEDURE — 36415 COLL VENOUS BLD VENIPUNCTURE: CPT

## 2018-09-10 PROCEDURE — 84443 ASSAY THYROID STIM HORMONE: CPT

## 2018-09-10 PROCEDURE — 99211 OFF/OP EST MAY X REQ PHY/QHP: CPT

## 2018-09-12 ENCOUNTER — CLINICAL SUPPORT (OUTPATIENT)
Dept: FAMILY MEDICINE CLINIC | Facility: CLINIC | Age: 63
End: 2018-09-12
Payer: MEDICARE

## 2018-09-12 DIAGNOSIS — Z11.1 ENCOUNTER FOR PPD SKIN TEST READING: Primary | ICD-10-CM

## 2018-09-12 LAB
INDURATION: 0 MM
TB SKIN TEST: NEGATIVE

## 2018-09-12 PROCEDURE — 99211 OFF/OP EST MAY X REQ PHY/QHP: CPT

## 2018-09-13 NOTE — PROGRESS NOTES
Assessment/Plan:    No problem-specific Assessment & Plan notes found for this encounter  Diagnoses and all orders for this visit:    Acquired hypothyroidism  -     TSH baseline; Future  -     TSH baseline; Future  -     levothyroxine 88 mcg tablet; Take 1 tablet (88 mcg total) by mouth daily Brand approved    Dementia with behavioral disturbance, unspecified dementia type  -     memantine (NAMENDA) 5 mg tablet; Take 1 tablet (5 mg total) by mouth daily    Health care maintenance  -     multivitamin (THERAGRAN) TABS; Take 1 tablet by mouth daily    Mixed hyperlipidemia  -     simvastatin (ZOCOR) 20 mg tablet; 1 po daily    Age-related osteoporosis without current pathological fracture  -     alendronate (FOSAMAX) 70 mg tablet; Take 1 tablet (70 mg total) by mouth every 7 days    Vitamin D deficiency  -     cholecalciferol (VITAMIN D3) 1,000 units tablet; Take 1 tablet (1,000 Units total) by mouth 2 (two) times a day    Excessive cerumen in both ear canals  -     carbamide peroxide (DEBROX) 6 5 % otic solution; Administer 5 drops into both ears 2 (two) times a day To use 1 week out of each month          Subjective:      Patient ID: Reece Summers is a 66 y o  female      HPI    The following portions of the patient's history were reviewed and updated as appropriate: current medications, past family history, past medical history, past social history, past surgical history and problem list     Review of Systems      Objective:      BP 98/66 (BP Location: Left arm, Patient Position: Sitting)   Pulse 66   Temp 97 5 °F (36 4 °C) (Tympanic)   Resp 18   Ht 4' 3 75" (1 314 m)   Wt 47 3 kg (104 lb 4 oz)   SpO2 94%   BMI 27 37 kg/m²          Physical Exam

## 2018-09-13 NOTE — PROGRESS NOTES
Assessment/Plan:    No problem-specific Assessment & Plan notes found for this encounter  Diagnoses and all orders for this visit:    Acquired hypothyroidism  -     TSH baseline; Future  -     TSH baseline; Future  -     levothyroxine 88 mcg tablet; Take 1 tablet (88 mcg total) by mouth daily Brand approved    Dementia with behavioral disturbance, unspecified dementia type  -     memantine (NAMENDA) 5 mg tablet; Take 1 tablet (5 mg total) by mouth daily    Health care maintenance  -     multivitamin (THERAGRAN) TABS; Take 1 tablet by mouth daily    Mixed hyperlipidemia  -     simvastatin (ZOCOR) 20 mg tablet; 1 po daily    Age-related osteoporosis without current pathological fracture  -     alendronate (FOSAMAX) 70 mg tablet; Take 1 tablet (70 mg total) by mouth every 7 days    Vitamin D deficiency  -     cholecalciferol (VITAMIN D3) 1,000 units tablet; Take 1 tablet (1,000 Units total) by mouth 2 (two) times a day    Excessive cerumen in both ear canals  -     carbamide peroxide (DEBROX) 6 5 % otic solution; Administer 5 drops into both ears 2 (two) times a day To use 1 week out of each month          Subjective:      Patient ID: Scar Plunkett is a 66 y o  female  Erika is here for her yearly physical for her group home there is no issues and her caretaker is not expressing any concerns as well              Review of Systems   Constitutional: Negative  HENT: Negative  Eyes: Negative  Respiratory: Negative  Cardiovascular: Negative  Gastrointestinal: Negative  Endocrine: Negative  Genitourinary: Negative  Musculoskeletal: Negative  Objective:      BP 98/66 (BP Location: Left arm, Patient Position: Sitting)   Pulse 66   Temp 97 5 °F (36 4 °C) (Tympanic)   Resp 18   Ht 4' 3 75" (1 314 m)   Wt 47 3 kg (104 lb 4 oz)   SpO2 94%   BMI 27 37 kg/m²          Physical Exam   Constitutional: She is oriented to person, place, and time   She appears well-developed and well-nourished  Mentally challenged   HENT:   Head: Normocephalic and atraumatic  Eyes: Pupils are equal, round, and reactive to light  Cardiovascular: Normal rate, regular rhythm and normal heart sounds  Pulmonary/Chest: Effort normal and breath sounds normal  No respiratory distress  She has no wheezes  She has no rales  She exhibits no tenderness  Abdominal: Soft  Bowel sounds are normal  She exhibits no distension and no mass  There is no tenderness  There is no rebound and no guarding  Musculoskeletal: Normal range of motion  She exhibits no edema or tenderness  Neurological: She is alert and oriented to person, place, and time  She has normal reflexes  Skin: Skin is warm  Psychiatric: She has a normal mood and affect   Her behavior is normal  Judgment and thought content normal

## 2018-09-18 ENCOUNTER — OFFICE VISIT (OUTPATIENT)
Dept: PODIATRY | Facility: CLINIC | Age: 63
End: 2018-09-18
Payer: MEDICARE

## 2018-09-18 VITALS
SYSTOLIC BLOOD PRESSURE: 98 MMHG | WEIGHT: 104.25 LBS | DIASTOLIC BLOOD PRESSURE: 66 MMHG | RESPIRATION RATE: 17 BRPM | BODY MASS INDEX: 24.13 KG/M2 | HEART RATE: 66 BPM | HEIGHT: 55 IN

## 2018-09-18 DIAGNOSIS — M21.962 DEFORMITY OF BOTH FEET: ICD-10-CM

## 2018-09-18 DIAGNOSIS — M21.961 DEFORMITY OF BOTH FEET: ICD-10-CM

## 2018-09-18 DIAGNOSIS — L84 CALLUS: Primary | ICD-10-CM

## 2018-09-18 DIAGNOSIS — I70.209 ATHEROSCLEROSIS OF ARTERIES OF EXTREMITIES (HCC): ICD-10-CM

## 2018-09-18 DIAGNOSIS — M79.672 PAIN IN BOTH FEET: ICD-10-CM

## 2018-09-18 DIAGNOSIS — M79.671 PAIN IN BOTH FEET: ICD-10-CM

## 2018-09-18 PROCEDURE — 11056 PARNG/CUTG B9 HYPRKR LES 2-4: CPT | Performed by: PODIATRIST

## 2018-09-18 NOTE — PROGRESS NOTES
Assessment/Plan:     Procedure: Debridement of hyperkeratosis  The procedure's risks and infection risk were discussed with the patient  Procedure Note:     Anesthesia: Aseptic debridement #10 scalpel  The area for debridement was located on the First metatarsal head  Fifth metatarsal head bilateral  Debridement was performed on partial thickness hyperkeratotic area(s)  Post-Procedure: the patient tolerated the procedure well  Complications: there were no complications  Procedure: toenail debridement performed  Indications for the procedure include professional performance of nail care required due to peripheral arterial disease-- and-- physical limitations  The procedure's risks and infection risk were discussed with the patient and with the guardian  Procedure Note: The toenails were debrided using heavy-duty nail nippers,-- with a nail ,-- the nail edges were smoothed-- and-- the hyperkeratotic material was grinded  Post-Procedure: the patient tolerated the procedure well  Complications: there were no complications  Aseptic debridement and planing of nails x10, manually, and mechanically debridement of pretrophic ulceration hyperkeratotic lesion first metatarsal head and hallux IPJ bilateral with #10 scalpel debrided nonviable tissue down to healthy viable tissue     Daily foot checks monitor for signs of infection follow-up 3 months     Diagnoses and all orders for this visit:    Callus    Atherosclerosis of arteries of extremities (HCC)    Pain in both feet    Deformity of both feet          Subjective:      Patient ID: Reece Summers is a 66 y o  female  Patient has painful calluses and thick painful nails that hurt when walking wearing shoes  Patient has moderate dry skin   Aids have not noticed any redness or signs of infection        The following portions of the patient's history were reviewed and updated as appropriate: allergies, current medications, past family history, past medical history, past social history, past surgical history and problem list     Review of Systems   Constitutional: Negative  HENT: Negative  Eyes: Negative  Respiratory: Negative  Cardiovascular: Negative  Gastrointestinal: Negative  Endocrine: Negative  Genitourinary: Negative  Musculoskeletal: Negative  Skin: Negative  Allergic/Immunologic: Negative  Neurological: Negative  Hematological: Negative  Psychiatric/Behavioral: Negative  Objective:      BP 98/66   Pulse 66   Resp 17   Ht 4' 3 75" (1 314 m)   Wt 47 3 kg (104 lb 4 oz)   BMI 27 37 kg/m²          Physical Exam    Constitutional: no acute distress, well appearing and well nourished  Cardiovascular: abnormal dorsalis pedis pulse,-- abnormal posterior tibialis pulse,-- dependence rubor-- and-- abnormal capillary refill  Orthopedic/Biomechanical: abnormal foot type,-- hammertoe(s),-- abnormal MPJ ROM,-- abnormal midtarsal joint ROM,-- abnormal subtalar joint ROM,-- decreased strength with dorsiflexion,-- decreased strength with plantarflexion,-- discolored nails,-- subungual debris-- and-- nail tenderness  Skin: abnormal texture,-- decreased skin turgor-- and-- keratoses  Left Foot: Appearance: Normal except as noted: a deformity-- and-- pes planus  Great toe deformities include a bunion  Second toe deformities include hammer toe  Third toe deformities include hammer toe  Forth toe deformities include hammer toe  Fifth toe deformities include hammer toe  Significant deformity of, bilateral feet, hammertoes, contracted, MPJs plantarflexed first met head  Special Tests: pre-ulcerative hyperkeratotic lesions plantar first metatarsal head bilateral right worse than left  Significant bunion deformity rigidly contracted hammertoes bilateral     Right Foot: Appearance: a deformity-- and-- pes planus  Great toe deformities include a bunion  Negative erythema  moderate edema mild hyperpigmentation lower legs bilateral  Negative calf tenderness  No signs of infection  Special Tests: All nails thick discolored dystrophic, positive subungual debris, positive pain on palpation  Neurological Exam: performed  Light touch was decreased bilaterally  Vibratory sensation was decreased in both first metatarsophalangeal joints  Response to monofilament test was intact bilaterally  Vascular Exam: Dorsalis pedis pulses were absent bilaterally  Posterior tibial pulses were absent bilaterally  Dependence rubor was present bilaterally  Capillary refill time was greater than 3 seconds bilaterally  Edema: mild bilaterally  Toenails: All of the toenails were elongated,-- hypertrophied,-- discolored,-- shown to have subungual debris-- and-- tender  Hyperkeratosis: present on both first sub metatarsals-- and-- present on both fifth sub metatarsals  Shoe Gear Evaluation: performed ()  Right Foot: width: d-- and-- length: 5  Left Foot: width: d-- and-- length: 5  Recommendation(s): athletic shoes    Significant bunion bilateral   Negative erythema no signs of infection  Skin is hairless and atrophic vascular changes noted bilateral  Plus one edema bilateral   The moderate dryness cracking of plantar skin bilateral  There is hyperkeratotic lesions of 1st metatarsal head bilateral   There is some blood beneath the skin but no open wound or signs of infection

## 2018-10-02 ENCOUNTER — TELEPHONE (OUTPATIENT)
Dept: FAMILY MEDICINE CLINIC | Facility: CLINIC | Age: 63
End: 2018-10-02

## 2018-10-02 NOTE — TELEPHONE ENCOUNTER
DR Fab Cruz - SHE FAXED A FORM FOR  DISCONTINUE ORDERS FOR EYE DROPS AND VITAMIN D   DID YOU RECEIVE THE FORM? SHE WILL REFAX IT TO YOU  ALSO WHEN YOU REFILLED LEVOTHYROXINE IT NEEDS TO BE SYNTHROID, NOT LEVOTHYROXINE  IT ALSO NEEDS TO GO TO [facility]  PT NEEDS BRAND NAME MEDICATIONS  THE SCRIPT NEEDS TO SAY BRAND NAME  SHE SAID IT WAS DISCUSSED AT PT'S LAST APPT  SHE WILL REFAX THE FORM AGAIN

## 2018-10-05 DIAGNOSIS — E03.9 ACQUIRED HYPOTHYROIDISM: Primary | ICD-10-CM

## 2018-10-05 DIAGNOSIS — H61.23 EXCESSIVE CERUMEN IN BOTH EAR CANALS: ICD-10-CM

## 2018-10-05 DIAGNOSIS — E55.9 VITAMIN D DEFICIENCY: ICD-10-CM

## 2018-10-05 RX ORDER — LEVOTHYROXINE SODIUM 88 UG/1
88 TABLET ORAL DAILY
Qty: 90 TABLET | Refills: 0 | Status: SHIPPED | OUTPATIENT
Start: 2018-10-05 | End: 2018-10-08 | Stop reason: ALTCHOICE

## 2018-10-05 RX ORDER — LEVOTHYROXINE SODIUM 88 UG/1
88 TABLET ORAL DAILY
Qty: 90 TABLET | Refills: 0 | Status: CANCELLED | OUTPATIENT
Start: 2018-10-05

## 2018-10-05 NOTE — TELEPHONE ENCOUNTER
Dr Suzi Mota  Patient'  came in to discuss thyroid medication  Left letter on your desk  She has asked you to call  essence

## 2018-10-08 RX ORDER — LEVOTHYROXINE SODIUM 88 UG/1
88 TABLET ORAL DAILY
Qty: 30 TABLET | Refills: 3 | Status: SHIPPED | OUTPATIENT
Start: 2018-10-08 | End: 2019-01-26 | Stop reason: SDUPTHER

## 2018-10-24 ENCOUNTER — TELEPHONE (OUTPATIENT)
Dept: FAMILY MEDICINE CLINIC | Facility: CLINIC | Age: 63
End: 2018-10-24

## 2018-11-01 ENCOUNTER — TELEPHONE (OUTPATIENT)
Dept: FAMILY MEDICINE CLINIC | Facility: CLINIC | Age: 63
End: 2018-11-01

## 2018-12-06 ENCOUNTER — OFFICE VISIT (OUTPATIENT)
Dept: FAMILY MEDICINE CLINIC | Facility: CLINIC | Age: 63
End: 2018-12-06
Payer: MEDICARE

## 2018-12-06 VITALS
WEIGHT: 101 LBS | BODY MASS INDEX: 26.52 KG/M2 | SYSTOLIC BLOOD PRESSURE: 112 MMHG | HEART RATE: 60 BPM | TEMPERATURE: 97.1 F | OXYGEN SATURATION: 96 % | DIASTOLIC BLOOD PRESSURE: 68 MMHG | RESPIRATION RATE: 18 BRPM

## 2018-12-06 DIAGNOSIS — E03.9 ACQUIRED HYPOTHYROIDISM: Primary | ICD-10-CM

## 2018-12-06 PROCEDURE — 99213 OFFICE O/P EST LOW 20 MIN: CPT | Performed by: FAMILY MEDICINE

## 2018-12-06 RX ORDER — INFLUENZA A VIRUS A/SINGAPORE/GP1908/2015 IVR-180A (H1N1) ANTIGEN (PROPIOLACTONE INACTIVATED), INFLUENZA A VIRUS A/HONG KONG/4801/2014 X-263B (H3N2) ANTIGEN (PROPIOLACTONE INACTIVATED), INFLUENZA B VIRUS B/BRISBANE/46/2015 ANTIGEN (PROPIOLACTONE INACTIVATED), AND INFLUENZA B VIRUS B/PHUKET/3073/2013 BVR-1B ANTIGEN (PROPIOLACTONE INACTIVATED) 15; 15; 15; 15 UG/.5ML; UG/.5ML; UG/.5ML; UG/.5ML
INJECTION, SUSPENSION INTRAMUSCULAR
Refills: 0 | COMMUNITY
Start: 2018-09-07 | End: 2018-12-06

## 2018-12-10 NOTE — PROGRESS NOTES
Assessment/Plan:    No problem-specific Assessment & Plan notes found for this encounter  There are no diagnoses linked to this encounter  Subjective:      Patient ID: Jaden Grande is a 66 y o  female  Patient is here with her caretaker  There are no issues expressed by her or Erika        The following portions of the patient's history were reviewed and updated as appropriate: current medications, past family history, past medical history, past social history, past surgical history and problem list     Review of Systems   Eyes: Negative  Respiratory: Negative  Cardiovascular: Negative  Endocrine: Negative  Objective:      /68   Pulse 60   Temp (!) 97 1 °F (36 2 °C)   Resp 18   Wt 45 8 kg (101 lb)   SpO2 96%   BMI 26 52 kg/m²          Physical Exam   Constitutional: She appears well-developed and well-nourished  HENT:   Head: Normocephalic  Cardiovascular: Normal rate and regular rhythm      Pulmonary/Chest: Effort normal and breath sounds normal

## 2018-12-11 ENCOUNTER — OFFICE VISIT (OUTPATIENT)
Dept: PODIATRY | Facility: CLINIC | Age: 63
End: 2018-12-11
Payer: MEDICARE

## 2018-12-11 VITALS
HEIGHT: 55 IN | HEART RATE: 60 BPM | BODY MASS INDEX: 23.37 KG/M2 | WEIGHT: 101 LBS | SYSTOLIC BLOOD PRESSURE: 112 MMHG | RESPIRATION RATE: 16 BRPM | DIASTOLIC BLOOD PRESSURE: 68 MMHG

## 2018-12-11 DIAGNOSIS — L84 CALLUS: Primary | ICD-10-CM

## 2018-12-11 DIAGNOSIS — M21.961 DEFORMITY OF BOTH FEET: ICD-10-CM

## 2018-12-11 DIAGNOSIS — I70.209 ATHEROSCLEROSIS OF ARTERIES OF EXTREMITIES (HCC): ICD-10-CM

## 2018-12-11 DIAGNOSIS — M79.671 PAIN IN BOTH FEET: ICD-10-CM

## 2018-12-11 DIAGNOSIS — M21.962 DEFORMITY OF BOTH FEET: ICD-10-CM

## 2018-12-11 DIAGNOSIS — M79.672 PAIN IN BOTH FEET: ICD-10-CM

## 2018-12-11 PROCEDURE — 11056 PARNG/CUTG B9 HYPRKR LES 2-4: CPT | Performed by: PODIATRIST

## 2018-12-11 NOTE — PROGRESS NOTES
Assessment/Plan:     Procedure: Debridement of hyperkeratosis  The procedure's risks and infection risk were discussed with the patient  Procedure Note:     Anesthesia: Aseptic debridement #10 scalpel  The area for debridement was located on the First metatarsal head  Fifth metatarsal head bilateral  Debridement was performed on partial thickness hyperkeratotic area(s)  Post-Procedure: the patient tolerated the procedure well  Complications: there were no complications  Procedure: toenail debridement performed  Indications for the procedure include professional performance of nail care required due to peripheral arterial disease-- and-- physical limitations  The procedure's risks and infection risk were discussed with the patient and with the guardian  Procedure Note: The toenails were debrided using heavy-duty nail nippers,-- with a nail ,-- the nail edges were smoothed-- and-- the hyperkeratotic material was grinded  Post-Procedure: the patient tolerated the procedure well  Complications: there were no complications  Aseptic debridement and planing of nails x10, manually, and mechanically debridement of pretrophic ulceration hyperkeratotic lesion first metatarsal head and hallux IPJ bilateral with #10 scalpel debrided nonviable tissue down to healthy viable tissue     Daily foot checks monitor for signs of infection follow-up 3 months     Diagnoses and all orders for this visit:    Callus    Atherosclerosis of arteries of extremities (HCC)    Pain in both feet    Deformity of both feet          Subjective:      Patient ID: El Dent is a 66 y o  female  Patient has painful calluses and thick painful nails that hurt when walking wearing shoes  Aids have not noticed any redness or signs of infection    Mild pain when walking and standing        The following portions of the patient's history were reviewed and updated as appropriate: allergies, current medications, past family history, past medical history, past social history, past surgical history and problem list     Review of Systems   Constitutional: Negative  HENT: Negative  Eyes: Negative  Respiratory: Negative  Cardiovascular: Negative  Gastrointestinal: Negative  Endocrine: Negative  Genitourinary: Negative  Musculoskeletal: Negative  Skin: Negative  Allergic/Immunologic: Negative  Neurological: Negative  Hematological: Negative  Psychiatric/Behavioral: Negative  Objective:      /68   Pulse 60   Resp 16   Ht 4' 3 75" (1 314 m)   Wt 45 8 kg (101 lb)   BMI 26 52 kg/m²          Physical Exam    Constitutional: no acute distress, well appearing and well nourished  Cardiovascular: abnormal dorsalis pedis pulse,-- abnormal posterior tibialis pulse,-- dependence rubor-- and-- abnormal capillary refill  Orthopedic/Biomechanical: abnormal foot type,-- hammertoe(s),-- abnormal MPJ ROM,-- abnormal midtarsal joint ROM,-- abnormal subtalar joint ROM,-- decreased strength with dorsiflexion,-- decreased strength with plantarflexion,-- discolored nails,-- subungual debris-- and-- nail tenderness  Skin: abnormal texture,-- decreased skin turgor-- and-- keratoses  Left Foot: Appearance: Normal except as noted: a deformity-- and-- pes planus  Great toe deformities include a bunion  Second toe deformities include hammer toe  Third toe deformities include hammer toe  Forth toe deformities include hammer toe  Fifth toe deformities include hammer toe  Significant deformity of, bilateral feet, hammertoes, contracted, MPJs plantarflexed first met head  Special Tests: pre-ulcerative hyperkeratotic lesions plantar first metatarsal head bilateral right worse than left  Significant bunion deformity rigidly contracted hammertoes bilateral     Right Foot: Appearance: a deformity-- and-- pes planus  Great toe deformities include a bunion  Negative erythema  moderate edema mild hyperpigmentation lower legs bilateral  Negative calf tenderness  No signs of infection  Special Tests: All nails thick discolored dystrophic, positive subungual debris, positive pain on palpation  Neurological Exam: performed  Light touch was decreased bilaterally  Vibratory sensation was decreased in both first metatarsophalangeal joints  Response to monofilament test was intact bilaterally  Vascular Exam: Dorsalis pedis pulses were absent bilaterally  Posterior tibial pulses were absent bilaterally  Dependence rubor was present bilaterally  Capillary refill time was greater than 3 seconds bilaterally  Edema: mild bilaterally  Toenails: All of the toenails were elongated,-- hypertrophied,-- discolored,-- shown to have subungual debris-- and-- tender  Hyperkeratosis: present on both first sub metatarsals-- and-- present on both fifth sub metatarsals  Shoe Gear Evaluation: performed ()  Right Foot: width: d-- and-- length: 5  Left Foot: width: d-- and-- length: 5  Recommendation(s): athletic shoes    Significant bunion bilateral   Negative erythema no signs of infection    Skin is hairless and atrophic vascular changes noted bilateral  Plus one edema bilateral   No open wound no signs of infection  Moderate xerosis bilateral  Mild edema bilateral  Negative Tinel sign tarsal tunnel  Patient has abducted gait

## 2019-01-26 DIAGNOSIS — E03.9 ACQUIRED HYPOTHYROIDISM: ICD-10-CM

## 2019-01-31 RX ORDER — LEVOTHYROXINE SODIUM 88 MCG
88 TABLET ORAL DAILY
Qty: 90 TABLET | Refills: 1 | Status: SHIPPED | OUTPATIENT
Start: 2019-01-31 | End: 2019-04-09

## 2019-03-05 ENCOUNTER — OFFICE VISIT (OUTPATIENT)
Dept: PODIATRY | Facility: CLINIC | Age: 64
End: 2019-03-05
Payer: MEDICARE

## 2019-03-05 VITALS
SYSTOLIC BLOOD PRESSURE: 109 MMHG | DIASTOLIC BLOOD PRESSURE: 74 MMHG | BODY MASS INDEX: 23.37 KG/M2 | WEIGHT: 101 LBS | HEIGHT: 55 IN

## 2019-03-05 DIAGNOSIS — M79.672 PAIN IN BOTH FEET: ICD-10-CM

## 2019-03-05 DIAGNOSIS — M21.961 DEFORMITY OF BOTH FEET: ICD-10-CM

## 2019-03-05 DIAGNOSIS — I70.209 ATHEROSCLEROSIS OF ARTERIES OF EXTREMITIES (HCC): ICD-10-CM

## 2019-03-05 DIAGNOSIS — L84 CALLUS: Primary | ICD-10-CM

## 2019-03-05 DIAGNOSIS — M79.671 PAIN IN BOTH FEET: ICD-10-CM

## 2019-03-05 DIAGNOSIS — M21.962 DEFORMITY OF BOTH FEET: ICD-10-CM

## 2019-03-05 PROCEDURE — 11056 PARNG/CUTG B9 HYPRKR LES 2-4: CPT | Performed by: PODIATRIST

## 2019-03-05 NOTE — PROGRESS NOTES
Assessment/Plan:     Procedure: Debridement of hyperkeratosis  The procedure's risks and infection risk were discussed with the patient  Procedure Note:     Anesthesia: Aseptic debridement #10 scalpel  The area for debridement was located on the First metatarsal head  Fifth metatarsal head bilateral  Debridement was performed on partial thickness hyperkeratotic area(s)  Post-Procedure: the patient tolerated the procedure well  Complications: there were no complications  Procedure: toenail debridement performed  Indications for the procedure include professional performance of nail care required due to peripheral arterial disease-- and-- physical limitations  The procedure's risks and infection risk were discussed with the patient and with the guardian  Procedure Note: The toenails were debrided using heavy-duty nail nippers,-- with a nail ,-- the nail edges were smoothed-- and-- the hyperkeratotic material was grinded  Post-Procedure: the patient tolerated the procedure well  Complications: there were no complications  Aseptic debridement and planing of nails x10, manually, and mechanically debridement of pretrophic ulceration hyperkeratotic lesion first metatarsal head and hallux IPJ bilateral with #10 scalpel debrided nonviable tissue down to healthy viable tissue     Daily foot checks monitor for signs of infection follow-up 3 months     Diagnoses and all orders for this visit:    Callus    Atherosclerosis of arteries of extremities (HCC)    Pain in both feet    Deformity of both feet          Subjective:      Patient ID: Marco A Patient is a 66 y o  female  Patient has painful calluses and thick painful nails that hurt when walking wearing shoes  Aids have not noticed any redness or signs of infection          The following portions of the patient's history were reviewed and updated as appropriate: allergies, current medications, past family history, past medical history, past social history, past surgical history and problem list     Review of Systems   Constitutional: Negative  HENT: Negative  Eyes: Negative  Respiratory: Negative  Cardiovascular: Negative  Gastrointestinal: Negative  Endocrine: Negative  Genitourinary: Negative  Musculoskeletal: Negative  Skin: Negative  Allergic/Immunologic: Negative  Neurological: Negative  Hematological: Negative  Psychiatric/Behavioral: Negative  Objective:      /74   Ht 4' 3 75" (1 314 m)   Wt 45 8 kg (101 lb)   BMI 26 52 kg/m²          Physical Exam    Constitutional: no acute distress, well appearing and well nourished  Cardiovascular: abnormal dorsalis pedis pulse,-- abnormal posterior tibialis pulse,-- dependence rubor-- and-- abnormal capillary refill  Orthopedic/Biomechanical: abnormal foot type,-- hammertoe(s),-- abnormal MPJ ROM,-- abnormal midtarsal joint ROM,-- abnormal subtalar joint ROM,-- decreased strength with dorsiflexion,-- decreased strength with plantarflexion,-- discolored nails,-- subungual debris-- and-- nail tenderness  Skin: abnormal texture,-- decreased skin turgor-- and-- keratoses  Left Foot: Appearance: Normal except as noted: a deformity-- and-- pes planus  Great toe deformities include a bunion  Second toe deformities include hammer toe  Third toe deformities include hammer toe  Forth toe deformities include hammer toe  Fifth toe deformities include hammer toe  Significant deformity of, bilateral feet, hammertoes, contracted, MPJs plantarflexed first met head  Special Tests: pre-ulcerative hyperkeratotic lesions plantar first metatarsal head bilateral right worse than left  Significant bunion deformity rigidly contracted hammertoes bilateral     Right Foot: Appearance: a deformity-- and-- pes planus  Great toe deformities include a bunion   Negative erythema  moderate edema mild hyperpigmentation lower legs bilateral  Negative calf tenderness  No signs of infection  Special Tests: All nails thick discolored dystrophic, positive subungual debris, positive pain on palpation  Neurological Exam: performed  Light touch was decreased bilaterally  Vibratory sensation was decreased in both first metatarsophalangeal joints  Response to monofilament test was intact bilaterally  Vascular Exam: Dorsalis pedis pulses were absent bilaterally  Posterior tibial pulses were absent bilaterally  Dependence rubor was present bilaterally  Capillary refill time was greater than 3 seconds bilaterally  Edema: mild bilaterally  Toenails: All of the toenails were elongated,-- hypertrophied,-- discolored,-- shown to have subungual debris-- and-- tender  Hyperkeratosis: present on both first sub metatarsals-- and-- present on both fifth sub metatarsals  Shoe Gear Evaluation: performed ()  Right Foot: width: d-- and-- length: 5  Left Foot: width: d-- and-- length: 5  Recommendation(s): athletic shoes    Significant bunion bilateral   Negative erythema no signs of infection    Skin is hairless and atrophic vascular changes noted bilateral  Plus one edema bilateral   No open wound no signs of infection    Mild edema bilateral  Negative Tinel sign tarsal tunnel  Patient has abducted gait  Moderate venous stasis bilateral, significant hyperpigmentation lower legs bilateral

## 2019-03-08 ENCOUNTER — APPOINTMENT (EMERGENCY)
Dept: RADIOLOGY | Facility: HOSPITAL | Age: 64
End: 2019-03-08
Payer: MEDICARE

## 2019-03-08 ENCOUNTER — HOSPITAL ENCOUNTER (EMERGENCY)
Facility: HOSPITAL | Age: 64
Discharge: HOME | End: 2019-03-08
Attending: EMERGENCY MEDICINE | Admitting: EMERGENCY MEDICINE
Payer: MEDICARE

## 2019-03-08 VITALS
HEART RATE: 80 BPM | BODY MASS INDEX: 26.25 KG/M2 | DIASTOLIC BLOOD PRESSURE: 78 MMHG | WEIGHT: 100 LBS | TEMPERATURE: 97.8 F | OXYGEN SATURATION: 93 % | RESPIRATION RATE: 16 BRPM | SYSTOLIC BLOOD PRESSURE: 124 MMHG

## 2019-03-08 DIAGNOSIS — S09.90XA MINOR HEAD INJURY, INITIAL ENCOUNTER: Primary | ICD-10-CM

## 2019-03-08 DIAGNOSIS — R51.9 HEADACHE: ICD-10-CM

## 2019-03-08 PROCEDURE — 70450 CT HEAD/BRAIN W/O DYE: CPT

## 2019-03-08 PROCEDURE — 72125 CT NECK SPINE W/O DYE: CPT

## 2019-03-08 PROCEDURE — 99283 EMERGENCY DEPT VISIT LOW MDM: CPT

## 2019-03-08 RX ORDER — ACETAMINOPHEN 325 MG/1
650 TABLET ORAL ONCE
Status: COMPLETED | OUTPATIENT
Start: 2019-03-08 | End: 2019-03-08

## 2019-03-08 RX ADMIN — ACETAMINOPHEN 650 MG: 325 TABLET, FILM COATED ORAL at 18:29

## 2019-03-08 NOTE — DISCHARGE INSTRUCTIONS
Please take a list of all of your medications and discharge paperwork with you to all of your follow-up medical visits  You can take 650 mg Tylenol every 6 hours as needed for headache  Please take all of your medications as directed  Please call your family doctor or return to the ER if you have increased shortness of breath, chest pain, fevers, chills, nausea, vomiting, diarrhea, or any other worsening symptoms  Fall Prevention   WHAT YOU NEED TO KNOW:   What is fall prevention? Fall prevention includes ways to make your home and other areas safer  It also includes ways you can move more carefully to prevent a fall  What increases my risk for falls? · Lack of vitamin D    · Not getting enough sleep each night    · Trouble walking or keeping your balance, or foot problems    · Health conditions that cause changes in your blood pressure, vision, or muscle strength and coordination    · Medicines that make you dizzy, weak, or sleepy    · Problems seeing clearly    · Shoes that have high heels or are not supportive    · Tripping hazards, such as items left on the floor, no handrails on the stairs, or broken steps  How can I help protect myself from falls? · Stand or sit up slowly  This may help you keep your balance and prevent falls  If you need to get up during the night, sit up first  Be sure you are fully awake before you stand  Turn on the light before you start walking  Go slowly in case you are still sleepy  Make sure you will not trip over any pets sleeping in the bedroom  · Use assistive devices as directed  Your healthcare provider may suggest that you use a cane or walker to help you keep your balance  You may need to have grab bars put in your bathroom near the toilet or in the shower  · Wear shoes that fit well and have soles that   Wear shoes both inside and outside  Use slippers with good   Do not wear shoes with high heels  · Wear a personal alarm    This is a device that allows you to call 911 if you fall and need help  Ask your healthcare provider for more information  · Stay active  Exercise can help strengthen your muscles and improve your balance  Your healthcare provider may recommend water aerobics or walking  He or she may also recommend physical therapy to improve your coordination  Never start an exercise program without talking to your healthcare provider first      · Manage medical conditions  Keep all appointments with your healthcare providers  Visit your eye doctor as directed  How can I make my home safer? · Add items to prevent falls in the bathroom  Put nonslip strips on your bath or shower floor to prevent you from slipping  Use a bath mat if you do not have carpet in the bathroom  This will prevent you from falling when you step out of the bath or shower  Use a shower seat so you do not need to stand while you shower  Sit on the toilet or a chair in your bathroom to dry yourself and put on clothing  This will prevent you from losing your balance from drying or dressing yourself while you are standing  · Keep paths clear  Remove books, shoes, and other objects from walkways and stairs  Place cords for telephones and lamps out of the way so that you do not need to walk over them  Tape them down if you cannot move them  Remove small rugs  If you cannot remove a rug, secure it with double-sided tape  This will prevent you from tripping  · Install bright lights in your home  Use night lights to help light paths to the bathroom or kitchen  Always turn on the light before you start walking  · Keep items you use often on shelves within reach  Do not use a step stool to help you reach an item  · Paint or place reflective tape on the edges of your stairs  This will help you see the stairs better  Call 911 or have someone else call if:   · You have fallen and are unconscious      · You have fallen and cannot move part of your body   Contact your healthcare provider if:   · You have fallen and have pain or a headache  · You have questions or concerns about your condition or care  CARE AGREEMENT:   You have the right to help plan your care  Learn about your health condition and how it may be treated  Discuss treatment options with your caregivers to decide what care you want to receive  You always have the right to refuse treatment  The above information is an  only  It is not intended as medical advice for individual conditions or treatments  Talk to your doctor, nurse or pharmacist before following any medical regimen to see if it is safe and effective for you  © 2017 [employer]  Information is for End User's use only and may not be sold, redistributed or otherwise used for commercial purposes  All illustrations and images included in CareNotes® are the copyrighted property of [employer]  or [employer]  Concussion   WHAT YOU NEED TO KNOW:   What is a concussion? A concussion is a mild brain injury  It is usually caused by a bump or blow to the head from a fall, a motor vehicle crash, or a sports injury  Being shaken forcefully may also cause a concussion  What are the signs and symptoms of a concussion? Symptoms may occur right away, or they may appear days after the concussion  After the injury, you may have any of these symptoms:  · A mild to moderate headache    · Dizziness, loss of balance, or blurry vision    · Nausea or vomiting     · A change in mood, such as restlessness or irritability    · Trouble thinking, remembering things, or concentrating    · Ringing in the ears    · Drowsiness or decreased energy    · Changes in your normal sleeping pattern  How is a concussion diagnosed? Your healthcare provider will ask how you were injured, and about your symptoms  He will also examine you   You may need any of the following:  · A neurologic exam  is also called neuro signs, neuro checks, or neuro status  A neurologic exam can show healthcare providers how well your brain works after your injury  Healthcare providers will check how your pupils (black dots in the center of each eye) react to light  They may check your memory and how easily you wake up  Your hand grasp and balance may also be tested  · A CT, or MRI  of your head may be done  You may be given contrast liquid to help the pictures show up better  Tell the healthcare provider if you have ever had an allergic reaction to contrast liquid  Do not enter the MRI room with anything metal  Metal can cause serious injury  Tell the healthcare provider if you have any metal in or on your body  How is a concussion managed? Usually no treatment is needed for a mild concussion  Concussion symptoms usually go away within about 10 days  The following may be recommended to manage your symptoms:  · Rest  from physical and mental activities as directed  Mental activities are those that require thinking, concentration, and attention  You will need to rest until your symptoms are gone  Rest will allow you to recover from your concussion  Ask your healthcare provider when you can return to work and other daily activities  · Have someone stay with you for the first 24 hours after your injury  Your healthcare provider should be contacted if your symptoms get worse, or you develop new symptoms  · Do not participate in sports and physical activities until your healthcare provider says it is okay  They could make your symptoms worse or lead to another concussion  Your healthcare provider will tell you when it is okay for you to return to sports or physical activities  · Acetaminophen  may help to decrease headache pain  It is available without a doctor's order  Ask how much to take and how often to take it  Follow directions  Acetaminophen can cause liver damage if not taken correctly      · NSAIDs , such as ibuprofen, help decrease swelling and pain  NSAIDs can cause stomach bleeding or kidney problems in certain people  If you take blood thinner medicine, always ask your healthcare provider if NSAIDs are safe for you  Always read the medicine label and follow directions  How can I help prevent another concussion? · Wear protective sports equipment that fit properly  Helmets help decrease your risk of a serious brain injury  Talk to your healthcare provider about ways you can decrease your risk for a concussion if you play sports  · Wear your seat belt  every time you travel  This helps to decrease your risk for a head injury if you are in a car accident  Have someone else call 911 for the following:   · Someone tries to wake you and cannot do so  · You have a seizure, increasing confusion, or a change in personality  · Your speech becomes slurred, or you have new vision problems  When should I seek immediate care? · You have a severe headache that does not go away  · You have arm or leg weakness, numbness, or new problems with coordination  · You have blood or clear fluid coming out of the ears or nose  When should I contact my healthcare provider? · You have nausea or are vomiting  · You feel more sleepy than usual     · Your symptoms get worse  · Your symptoms last longer than 6 weeks after the injury  · You have questions or concerns about your condition or care  CARE AGREEMENT:   You have the right to help plan your care  Learn about your health condition and how it may be treated  Discuss treatment options with your caregivers to decide what care you want to receive  You always have the right to refuse treatment  The above information is an  only  It is not intended as medical advice for individual conditions or treatments  Talk to your doctor, nurse or pharmacist before following any medical regimen to see if it is safe and effective for you    © 2017 [facility] Information is for End User's use only and may not be sold, redistributed or otherwise used for commercial purposes  All illustrations and images included in CareNotes® are the copyrighted property of [employer]  or [employer]
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
## 2019-03-19 ENCOUNTER — OFFICE VISIT (OUTPATIENT)
Dept: FAMILY MEDICINE CLINIC | Facility: CLINIC | Age: 64
End: 2019-03-19
Payer: MEDICARE

## 2019-03-19 VITALS
BODY MASS INDEX: 22.21 KG/M2 | TEMPERATURE: 97.6 F | WEIGHT: 96 LBS | SYSTOLIC BLOOD PRESSURE: 122 MMHG | HEIGHT: 55 IN | DIASTOLIC BLOOD PRESSURE: 60 MMHG | HEART RATE: 70 BPM | OXYGEN SATURATION: 99 %

## 2019-03-19 DIAGNOSIS — E03.9 HYPOTHYROIDISM, UNSPECIFIED TYPE: Primary | ICD-10-CM

## 2019-03-19 PROCEDURE — 99213 OFFICE O/P EST LOW 20 MIN: CPT | Performed by: FAMILY MEDICINE
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
## 2019-04-01 ENCOUNTER — APPOINTMENT (OUTPATIENT)
Dept: LAB | Facility: HOSPITAL | Age: 64
End: 2019-04-01
Payer: MEDICARE

## 2019-04-01 ENCOUNTER — TRANSCRIBE ORDERS (OUTPATIENT)
Dept: ADMINISTRATIVE | Facility: HOSPITAL | Age: 64
End: 2019-04-01

## 2019-04-01 DIAGNOSIS — E03.9 HYPOTHYROIDISM, UNSPECIFIED TYPE: ICD-10-CM

## 2019-04-01 LAB
T4 FREE SERPL-MCNC: 1.55 NG/DL (ref 0.76–1.46)
TSH SERPL DL<=0.05 MIU/L-ACNC: <0.007 UIU/ML (ref 0.36–3.74)

## 2019-04-01 PROCEDURE — 36415 COLL VENOUS BLD VENIPUNCTURE: CPT

## 2019-04-01 PROCEDURE — 84443 ASSAY THYROID STIM HORMONE: CPT

## 2019-04-01 PROCEDURE — 84439 ASSAY OF FREE THYROXINE: CPT

## 2019-04-09 DIAGNOSIS — E03.9 ACQUIRED HYPOTHYROIDISM: Primary | ICD-10-CM

## 2019-04-09 RX ORDER — LEVOTHYROXINE SODIUM 0.07 MG/1
75 TABLET ORAL DAILY
Qty: 90 TABLET | Refills: 0 | Status: SHIPPED | OUTPATIENT
Start: 2019-04-09 | End: 2019-04-12 | Stop reason: CLARIF

## 2019-04-10 ENCOUNTER — TELEPHONE (OUTPATIENT)
Dept: FAMILY MEDICINE CLINIC | Facility: CLINIC | Age: 64
End: 2019-04-10

## 2019-04-12 ENCOUNTER — TELEPHONE (OUTPATIENT)
Dept: OTHER | Facility: OTHER | Age: 64
End: 2019-04-12

## 2019-04-12 DIAGNOSIS — E03.9 HYPOTHYROIDISM: Primary | ICD-10-CM

## 2019-04-12 RX ORDER — LEVOTHYROXINE SODIUM 0.07 MG/1
75 TABLET ORAL
Qty: 30 TABLET | Refills: 1 | Status: SHIPPED | OUTPATIENT
Start: 2019-04-12 | End: 2019-06-11 | Stop reason: SDUPTHER

## 2019-04-15 DIAGNOSIS — E03.9 HYPOTHYROIDISM: ICD-10-CM

## 2019-04-16 RX ORDER — LEVOTHYROXINE SODIUM 0.07 MG/1
75 TABLET ORAL
Qty: 90 TABLET | Refills: 1 | OUTPATIENT
Start: 2019-04-16

## 2019-04-24 DIAGNOSIS — E78.2 MIXED HYPERLIPIDEMIA: ICD-10-CM

## 2019-04-24 RX ORDER — SIMVASTATIN 20 MG
TABLET ORAL
Qty: 30 TABLET | Refills: 4 | Status: SHIPPED | OUTPATIENT
Start: 2019-04-24

## 2019-05-17 ENCOUNTER — TELEPHONE (OUTPATIENT)
Dept: FAMILY MEDICINE CLINIC | Facility: CLINIC | Age: 64
End: 2019-05-17

## 2019-05-21 ENCOUNTER — OFFICE VISIT (OUTPATIENT)
Dept: PODIATRY | Facility: CLINIC | Age: 64
End: 2019-05-21
Payer: MEDICARE

## 2019-05-21 VITALS
HEART RATE: 70 BPM | HEIGHT: 55 IN | SYSTOLIC BLOOD PRESSURE: 122 MMHG | BODY MASS INDEX: 22.21 KG/M2 | WEIGHT: 96 LBS | DIASTOLIC BLOOD PRESSURE: 60 MMHG

## 2019-05-21 DIAGNOSIS — M79.672 PAIN IN BOTH FEET: ICD-10-CM

## 2019-05-21 DIAGNOSIS — L60.0 INGROWN TOENAIL: ICD-10-CM

## 2019-05-21 DIAGNOSIS — M79.671 PAIN IN BOTH FEET: ICD-10-CM

## 2019-05-21 DIAGNOSIS — I70.209 ATHEROSCLEROSIS OF ARTERIES OF EXTREMITIES (HCC): Primary | ICD-10-CM

## 2019-05-21 PROCEDURE — 99212 OFFICE O/P EST SF 10 MIN: CPT | Performed by: PODIATRIST

## 2019-05-22 DIAGNOSIS — E55.9 VITAMIN D DEFICIENCY: ICD-10-CM

## 2019-05-23 RX ORDER — MELATONIN
Qty: 60 TABLET | Refills: 10 | Status: SHIPPED | OUTPATIENT
Start: 2019-05-23 | End: 2020-06-22

## 2019-06-11 DIAGNOSIS — E03.9 HYPOTHYROIDISM: ICD-10-CM

## 2019-06-11 RX ORDER — LEVOTHYROXINE SODIUM 0.07 MG/1
75 TABLET ORAL
Qty: 30 TABLET | Refills: 5 | Status: SHIPPED | OUTPATIENT
Start: 2019-06-11

## 2019-06-12 DIAGNOSIS — E03.9 ACQUIRED HYPOTHYROIDISM: Primary | ICD-10-CM

## 2019-08-13 ENCOUNTER — OFFICE VISIT (OUTPATIENT)
Dept: PODIATRY | Facility: CLINIC | Age: 64
End: 2019-08-13
Payer: MEDICARE

## 2019-08-13 VITALS
BODY MASS INDEX: 22.21 KG/M2 | SYSTOLIC BLOOD PRESSURE: 123 MMHG | DIASTOLIC BLOOD PRESSURE: 62 MMHG | HEIGHT: 55 IN | WEIGHT: 96 LBS

## 2019-08-13 DIAGNOSIS — M79.672 PAIN IN BOTH FEET: ICD-10-CM

## 2019-08-13 DIAGNOSIS — B35.1 ONYCHOMYCOSIS: ICD-10-CM

## 2019-08-13 DIAGNOSIS — M79.671 PAIN IN BOTH FEET: ICD-10-CM

## 2019-08-13 DIAGNOSIS — I70.209 ATHEROSCLEROSIS OF ARTERIES OF EXTREMITIES (HCC): Primary | ICD-10-CM

## 2019-08-13 PROCEDURE — 11721 DEBRIDE NAIL 6 OR MORE: CPT | Performed by: PODIATRIST

## 2019-08-13 NOTE — PROGRESS NOTES
Assessment/Plan:    Aseptic debridement and planning of nails x10 and manually and mechanically  Daily foot checks monitor for signs of infection  Follow-up 3 months     Diagnoses and all orders for this visit:    Atherosclerosis of arteries of extremities (HCC)    Onychomycosis    Pain in both feet          Subjective:      Patient ID: Nida Santo is a 60 y o  female  Patient was referred from [facility] for foot care  Patient has thick painful nails that hurt when walking wearing shoes  Aids have not noticed any redness or signs of infection      Past Medical History:   Diagnosis Date    Dementia     Depression with anxiety     resolved: 10/12/2017    Disease of thyroid gland     Diverticulosis     Down's syndrome     Fatty liver     Hyperkalemia     last assessed: 7/15/2014    Hyperlipidemia     Hypothyroidism     last assessed: 8/31/2017    Mental retardation     Osteoporosis     Psychiatric disorder          Current Outpatient Medications:     alendronate (FOSAMAX) 70 mg tablet, Take 1 tablet (70 mg total) by mouth every 7 days, Disp: 12 tablet, Rfl: 3    carbamide peroxide (DEBROX) 6 5 % otic solution, Administer 5 drops into both ears 2 (two) times a day To use 1 week out of each month, Disp: 15 mL, Rfl: 0    cholecalciferol (VITAMIN D3) 1,000 units tablet, TAKE 1 TABLET BY MOUTH TWICE A DAY (8AM&8PM), Disp: 60 tablet, Rfl: 10    fluvoxaMINE (LUVOX) 25 MG tablet, Take 25 mg by mouth 2 (two) times a day , Disp: , Rfl:     levothyroxine (SYNTHROID) 75 mcg tablet, Take 1 tablet (75 mcg total) by mouth daily in the early morning At 7 am, Disp: 30 tablet, Rfl: 5    LORazepam (ATIVAN) 0 5 mg tablet, Take 0 5 mg by mouth daily as needed for anxiety  , Disp: , Rfl:     memantine (NAMENDA) 5 mg tablet, Take 1 tablet (5 mg total) by mouth daily, Disp: 90 tablet, Rfl: 3    multivitamin (THERAGRAN) TABS, Take 1 tablet by mouth daily, Disp: 90 tablet, Rfl: 3    simvastatin (ZOCOR) 20 mg tablet, TAKE 1 TABLET BY MOUTH EVERY DAY (8AM) -[facility], Disp: 30 tablet, Rfl: 4    No past surgical history on file  No Known Allergies    Patient Active Problem List   Diagnosis    Atherosclerosis of arteries of extremities (HCC)    Callus    Deformity of ankle and foot, acquired    Difficulty in walking    Pain in extremity    Trisomy 21, Down syndrome       Review of Systems   Constitutional: Negative  HENT: Negative  Eyes: Negative  Respiratory: Negative  Cardiovascular: Negative  Gastrointestinal: Negative  Endocrine: Negative  Genitourinary: Negative  Musculoskeletal: Positive for arthralgias  Skin: Positive for color change  Allergic/Immunologic: Negative  Neurological: Negative  Hematological: Negative  Psychiatric/Behavioral: Negative  Objective:  Patient's shoes and socks were removed, feet examined  /62   Ht 4' 3 75" (1 314 m)   Wt 43 5 kg (96 lb)   BMI 25 20 kg/m²       Foot Exam    General  General Appearance: appears stated age and healthy   Orientation: alert and oriented to person, place, and time   Affect: appropriate   Gait: unimpaired       Right Foot/Ankle     Inspection and Palpation  Arch: pes planus  Hammertoes: second toe, fifth toe, fourth toe and third toe  Hallux valgus: yes  Skin Exam: callus, dry skin and skin changes; Neurovascular  Dorsalis pedis: 1+  Posterior tibial: absent      Left Foot/Ankle      Inspection and Palpation  Arch: pes planus  Hammertoes: second toe, fifth toe, fourth toe and third toe  Hallux valgus: yes  Skin Exam: dry skin and skin changes; Neurovascular  Dorsalis pedis: 1+  Posterior tibial: absent          Right Foot/Ankle   Right Foot Inspection  Skin Exam: dry skin, callus and callus                              Vascular    The right DP pulse is 1+  The right PT pulse is absent     Right Toe  - Comprehensive Exam  Arch: pes planus  Hammertoes: second toe, fifth toe, fourth toe and third toe  Hallux valgus: yes    Left Foot/Ankle  Left Foot Inspection  Skin Exam: dry skin                                           Vascular    The left DP pulse is 1+  The left PT pulse is absent  Left Toe  - Comprehensive Exam  Arch: pes planus  Hammertoes: second toe, fifth toe, fourth toe and third toe  Hallux valgus: yes         Physical Exam   Cardiovascular:   Pulses:       Dorsalis pedis pulses are 1+ on the right side, and 1+ on the left side  Posterior tibial pulses are Absent on the right side, and Absent on the left side  Feet:   Right Foot:   Skin Integrity: Positive for callus and dry skin  Left Foot:   Skin Integrity: Positive for dry skin  Nursing note and vitals reviewed          Procedures       Large bunion bilateral  Rigidly contracted hammertoes 2 through  5 bilateral   Skin hairless and atrophic  All nails thick, discolored, dystrophic positive subungual debris positive pain on palpation  Hyperpigmentation and venous stasis bilateral  Negative calf tenderness  No open wounds or signs of infection  Moderate edema bilateral  Significant nail fungus bilateral Nails are thickened discolored brittle  Skin hairless and atrophic bilateral  Moderate edema bilateral moderate hyperpigmentation lower legs bilateral  No open wounds or signs of infection

## 2019-11-12 ENCOUNTER — OFFICE VISIT (OUTPATIENT)
Dept: PODIATRY | Facility: CLINIC | Age: 64
End: 2019-11-12
Payer: MEDICARE

## 2019-11-12 VITALS
WEIGHT: 96 LBS | DIASTOLIC BLOOD PRESSURE: 62 MMHG | HEIGHT: 55 IN | SYSTOLIC BLOOD PRESSURE: 123 MMHG | BODY MASS INDEX: 22.21 KG/M2 | RESPIRATION RATE: 17 BRPM | HEART RATE: 70 BPM

## 2019-11-12 DIAGNOSIS — L84 CALLUS: ICD-10-CM

## 2019-11-12 DIAGNOSIS — I70.209 ATHEROSCLEROSIS OF ARTERIES OF EXTREMITIES (HCC): Primary | ICD-10-CM

## 2019-11-12 DIAGNOSIS — M79.671 PAIN IN BOTH FEET: ICD-10-CM

## 2019-11-12 DIAGNOSIS — B35.1 ONYCHOMYCOSIS: ICD-10-CM

## 2019-11-12 DIAGNOSIS — M79.672 PAIN IN BOTH FEET: ICD-10-CM

## 2019-11-12 PROCEDURE — 11056 PARNG/CUTG B9 HYPRKR LES 2-4: CPT | Performed by: PODIATRIST

## 2019-11-12 PROCEDURE — 11720 DEBRIDE NAIL 1-5: CPT | Performed by: PODIATRIST

## 2019-11-12 NOTE — PROGRESS NOTES
Assessment/Plan:     Aseptic debridement and planning of nails x10 and manually and mechanically  Daily foot checks monitor for signs of infection  All calluses debrided without pain or complication  Follow-up 3 months      Diagnoses and all orders for this visit:     Atherosclerosis of arteries of extremities (HCC)     Onychomycosis     Pain in both feet    Callus formation             Subjective:       Patient ID: Yulissa Saha is a 60 y o  female      Patient was referred from [facility] for foot care  Patient has thick painful nails that hurt when walking wearing shoes  Aids have not noticed any redness or signs of infection  She has pain in her feet with ambulation  No history of trauma  She has pain with shoe wear         Medical History        Past Medical History:   Diagnosis Date    Dementia      Depression with anxiety       resolved: 10/12/2017    Disease of thyroid gland      Diverticulosis      Down's syndrome      Fatty liver      Hyperkalemia       last assessed: 7/15/2014    Hyperlipidemia      Hypothyroidism       last assessed: 8/31/2017    Mental retardation      Osteoporosis      Psychiatric disorder                 Current Outpatient Medications:     alendronate (FOSAMAX) 70 mg tablet, Take 1 tablet (70 mg total) by mouth every 7 days, Disp: 12 tablet, Rfl: 3    carbamide peroxide (DEBROX) 6 5 % otic solution, Administer 5 drops into both ears 2 (two) times a day To use 1 week out of each month, Disp: 15 mL, Rfl: 0    cholecalciferol (VITAMIN D3) 1,000 units tablet, TAKE 1 TABLET BY MOUTH TWICE A DAY (8AM&8PM), Disp: 60 tablet, Rfl: 10    fluvoxaMINE (LUVOX) 25 MG tablet, Take 25 mg by mouth 2 (two) times a day , Disp: , Rfl:     levothyroxine (SYNTHROID) 75 mcg tablet, Take 1 tablet (75 mcg total) by mouth daily in the early morning At 7 am, Disp: 30 tablet, Rfl: 5    LORazepam (ATIVAN) 0 5 mg tablet, Take 0 5 mg by mouth daily as needed for anxiety  , Disp: , Rfl:    memantine (NAMENDA) 5 mg tablet, Take 1 tablet (5 mg total) by mouth daily, Disp: 90 tablet, Rfl: 3    multivitamin (THERAGRAN) TABS, Take 1 tablet by mouth daily, Disp: 90 tablet, Rfl: 3    simvastatin (ZOCOR) 20 mg tablet, TAKE 1 TABLET BY MOUTH EVERY DAY (8AM) -Formerly named [facility], Disp: 30 tablet, Rfl: 4     Surgical History   No past surgical history on file         No Known Allergies         Patient Active Problem List   Diagnosis    Atherosclerosis of arteries of extremities (HCC)    Callus    Deformity of ankle and foot, acquired    Difficulty in walking    Pain in extremity    Trisomy 21, Down syndrome         Review of Systems   Constitutional: Negative  HENT: Negative  Eyes: Negative  Respiratory: Negative  Cardiovascular: Negative  Gastrointestinal: Negative  Endocrine: Negative  Genitourinary: Negative  Musculoskeletal: Positive for arthralgias  Skin: Positive for color change  Allergic/Immunologic: Negative  Neurological: Negative  Hematological: Negative      Psychiatric/Behavioral: Negative            Objective:  Patient's shoes and socks were removed, feet examined       /62   Ht 4' 3 75" (1 314 m)   Wt 43 5 kg (96 lb)   BMI 25 20 kg/m²         Foot Exam     General  General Appearance: appears stated age and healthy   Orientation: alert and oriented to person, place, and time   Affect: appropriate   Gait: unimpaired         Right Foot/Ankle      Inspection and Palpation  Arch: pes planus  Hammertoes: second toe, fifth toe, fourth toe and third toe  Hallux valgus: yes  Skin Exam: callus, dry skin and skin changes; callus submetatarsal 1     Neurovascular  Dorsalis pedis: 1+  Posterior tibial: absent        Left Foot/Ankle       Inspection and Palpation  Arch: pes planus  Hammertoes: second toe, fifth toe, fourth toe and third toe  Hallux valgus: yes  Skin Exam: dry skin and skin changes; callus submetatarsal 1     Neurovascular  Dorsalis pedis: 1+  Posterior tibial: absent              Right Foot/Ankle   Right Foot Inspection  Skin Exam: dry skin, callus and callus                                 Vascular     The right DP pulse is 1+  The right PT pulse is absent  Right Toe  - Comprehensive Exam  Arch: pes planus  Hammertoes: second toe, fifth toe, fourth toe and third toe  Hallux valgus: yes     Left Foot/Ankle  Left Foot Inspection  Skin Exam: dry skin                                             Vascular     The left DP pulse is 1+  The left PT pulse is absent  Left Toe  - Comprehensive Exam  Arch: pes planus  Hammertoes: second toe, fifth toe, fourth toe and third toe  Hallux valgus: yes          Physical Exam   Cardiovascular:   Pulses:       Dorsalis pedis pulses are 1+ on the right side, and 1+ on the left side  Posterior tibial pulses are Absent on the right side, and Absent on the left side  Feet:   Right Foot:   Skin Integrity: Positive for callus and dry skin  Left Foot:   Skin Integrity: Positive for dry skin  Nursing note and vitals reviewed          Procedures         Large bunion bilateral  Rigidly contracted hammertoes 2 through  5 bilateral   Skin hairless and atrophic  All nails thick, discolored, dystrophic positive subungual debris positive pain on palpation  Hallux nails demonstrates severe onychocryptosis  There thick with malodor and debris    Distal lysis noted  Hyperpigmentation and venous stasis bilateral  Negative calf tenderness  No open wounds or signs of infection  Moderate edema bilateral  Significant nail fungus bilateral Nails are thickened discolored brittle  Skin hairless and atrophic bilateral  Moderate edema bilateral moderate hyperpigmentation lower legs bilateral  No open wounds or signs of infection

## 2020-01-23 ENCOUNTER — OFFICE VISIT (OUTPATIENT)
Dept: PODIATRY | Facility: CLINIC | Age: 65
End: 2020-01-23
Payer: MEDICARE

## 2020-01-23 VITALS — WEIGHT: 96 LBS | BODY MASS INDEX: 22.21 KG/M2 | HEIGHT: 55 IN

## 2020-01-23 DIAGNOSIS — M79.672 PAIN IN BOTH FEET: Primary | ICD-10-CM

## 2020-01-23 DIAGNOSIS — M79.671 PAIN IN BOTH FEET: Primary | ICD-10-CM

## 2020-01-23 DIAGNOSIS — B35.1 ONYCHOMYCOSIS: ICD-10-CM

## 2020-01-23 PROCEDURE — 11721 DEBRIDE NAIL 6 OR MORE: CPT | Performed by: PODIATRIST

## 2020-01-29 NOTE — PROGRESS NOTES
Assessment/Plan:     Aseptic debridement and planning of nails x10 and manually and mechanically  Daily foot checks monitor for signs of infection  All calluses debrided without pain or complication  Follow-up 3 months      Diagnoses and all orders for this visit:     Atherosclerosis of arteries of extremities (HCC)     Onychomycosis     Pain in both feet    Callus formation             Subjective:       Patient ID: India Rubin is a 60 y o  female      Patient was referred from [facility] for foot care  Patient has thick painful nails that hurt when walking wearing shoes  Aids have not noticed any redness or signs of infection  She has pain in her feet with ambulation  No history of trauma  She has pain with shoe wear         Medical History        Past Medical History:   Diagnosis Date    Dementia      Depression with anxiety       resolved: 10/12/2017    Disease of thyroid gland      Diverticulosis      Down's syndrome      Fatty liver      Hyperkalemia       last assessed: 7/15/2014    Hyperlipidemia      Hypothyroidism       last assessed: 8/31/2017    Mental retardation      Osteoporosis      Psychiatric disorder                 Current Outpatient Medications:     alendronate (FOSAMAX) 70 mg tablet, Take 1 tablet (70 mg total) by mouth every 7 days, Disp: 12 tablet, Rfl: 3    carbamide peroxide (DEBROX) 6 5 % otic solution, Administer 5 drops into both ears 2 (two) times a day To use 1 week out of each month, Disp: 15 mL, Rfl: 0    cholecalciferol (VITAMIN D3) 1,000 units tablet, TAKE 1 TABLET BY MOUTH TWICE A DAY (8AM&8PM), Disp: 60 tablet, Rfl: 10    fluvoxaMINE (LUVOX) 25 MG tablet, Take 25 mg by mouth 2 (two) times a day , Disp: , Rfl:     levothyroxine (SYNTHROID) 75 mcg tablet, Take 1 tablet (75 mcg total) by mouth daily in the early morning At 7 am, Disp: 30 tablet, Rfl: 5    LORazepam (ATIVAN) 0 5 mg tablet, Take 0 5 mg by mouth daily as needed for anxiety  , Disp: , Rfl:    memantine (NAMENDA) 5 mg tablet, Take 1 tablet (5 mg total) by mouth daily, Disp: 90 tablet, Rfl: 3    multivitamin (THERAGRAN) TABS, Take 1 tablet by mouth daily, Disp: 90 tablet, Rfl: 3    simvastatin (ZOCOR) 20 mg tablet, TAKE 1 TABLET BY MOUTH EVERY DAY (8AM) -[facility], Disp: 30 tablet, Rfl: 4     Surgical History   No past surgical history on file         No Known Allergies         Patient Active Problem List   Diagnosis    Atherosclerosis of arteries of extremities (HCC)    Callus    Deformity of ankle and foot, acquired    Difficulty in walking    Pain in extremity    Trisomy 21, Down syndrome         Review of Systems   Constitutional: Negative  HENT: Negative  Eyes: Negative  Respiratory: Negative  Cardiovascular: Negative  Gastrointestinal: Negative  Endocrine: Negative  Genitourinary: Negative  Musculoskeletal: Positive for arthralgias  Skin: Positive for color change  Allergic/Immunologic: Negative  Neurological: Negative  Hematological: Negative      Psychiatric/Behavioral: Negative            Objective:  Patient's shoes and socks were removed, feet examined       /62   Ht 4' 3 75" (1 314 m)   Wt 43 5 kg (96 lb)   BMI 25 20 kg/m²         Foot Exam     General  General Appearance: appears stated age and healthy   Orientation: alert and oriented to person, place, and time   Affect: appropriate   Gait: unimpaired         Right Foot/Ankle      Inspection and Palpation  Arch: pes planus  Hammertoes: second toe, fifth toe, fourth toe and third toe  Hallux valgus: yes  Skin Exam: callus, dry skin and skin changes; callus submetatarsal 1     Neurovascular  Dorsalis pedis: 1+  Posterior tibial: absent        Left Foot/Ankle       Inspection and Palpation  Arch: pes planus  Hammertoes: second toe, fifth toe, fourth toe and third toe  Hallux valgus: yes  Skin Exam: dry skin and skin changes; callus submetatarsal 1     Neurovascular  Dorsalis pedis: 1+  Posterior tibial: absent              Right Foot/Ankle   Right Foot Inspection  Skin Exam: dry skin, callus and callus                                 Vascular     The right DP pulse is 1+  The right PT pulse is absent  Right Toe  - Comprehensive Exam  Arch: pes planus  Hammertoes: second toe, fifth toe, fourth toe and third toe  Hallux valgus: yes     Left Foot/Ankle  Left Foot Inspection  Skin Exam: dry skin                                             Vascular     The left DP pulse is 1+  The left PT pulse is absent  Left Toe  - Comprehensive Exam  Arch: pes planus  Hammertoes: second toe, fifth toe, fourth toe and third toe  Hallux valgus: yes          Physical Exam   Cardiovascular:   Pulses:       Dorsalis pedis pulses are 1+ on the right side, and 1+ on the left side  Posterior tibial pulses are Absent on the right side, and Absent on the left side  Feet:   Right Foot:   Skin Integrity: Positive for callus and dry skin  Left Foot:   Skin Integrity: Positive for dry skin  Nursing note and vitals reviewed          Procedures         Large bunion bilateral  Rigidly contracted hammertoes 2 through  5 bilateral   Skin hairless and atrophic  All nails thick, discolored, dystrophic positive subungual debris positive pain on palpation  Hallux nails demonstrates severe onychocryptosis  There thick with malodor and debris    Distal lysis noted  Hyperpigmentation and venous stasis bilateral  Negative calf tenderness  No open wounds or signs of infection  Moderate edema bilateral  Significant nail fungus bilateral Nails are thickened discolored brittle  Skin hairless and atrophic bilateral  Moderate edema bilateral moderate hyperpigmentation lower legs bilateral  No open wounds or signs of infection

## 2020-06-10 ENCOUNTER — APPOINTMENT (OUTPATIENT)
Dept: LAB | Facility: CLINIC | Age: 65
End: 2020-06-10
Payer: MEDICARE

## 2020-06-10 ENCOUNTER — TRANSCRIBE ORDERS (OUTPATIENT)
Dept: LAB | Facility: CLINIC | Age: 65
End: 2020-06-10

## 2020-06-10 DIAGNOSIS — I51.9 MYXEDEMA HEART DISEASE: Primary | ICD-10-CM

## 2020-06-10 DIAGNOSIS — E03.9 MYXEDEMA HEART DISEASE: Primary | ICD-10-CM

## 2020-06-10 LAB
T4 FREE SERPL-MCNC: 1.08 NG/DL (ref 0.76–1.46)
TSH SERPL DL<=0.05 MIU/L-ACNC: 0.53 UIU/ML (ref 0.36–3.74)

## 2020-06-10 PROCEDURE — 36415 COLL VENOUS BLD VENIPUNCTURE: CPT

## 2020-06-10 PROCEDURE — 84439 ASSAY OF FREE THYROXINE: CPT

## 2020-06-10 PROCEDURE — 84443 ASSAY THYROID STIM HORMONE: CPT

## 2020-06-18 DIAGNOSIS — E55.9 VITAMIN D DEFICIENCY: ICD-10-CM

## 2020-06-19 ENCOUNTER — APPOINTMENT (OUTPATIENT)
Dept: LAB | Facility: CLINIC | Age: 65
End: 2020-06-19
Payer: MEDICARE

## 2020-06-19 ENCOUNTER — TRANSCRIBE ORDERS (OUTPATIENT)
Dept: LAB | Facility: CLINIC | Age: 65
End: 2020-06-19

## 2020-06-19 DIAGNOSIS — D75.1 POLYCYTHEMIA: Primary | ICD-10-CM

## 2020-06-19 DIAGNOSIS — D75.1 POLYCYTHEMIA: ICD-10-CM

## 2020-06-19 LAB
BASOPHILS # BLD AUTO: 0.05 THOUSANDS/ΜL (ref 0–0.1)
BASOPHILS NFR BLD AUTO: 1 % (ref 0–1)
EOSINOPHIL # BLD AUTO: 0.04 THOUSAND/ΜL (ref 0–0.61)
EOSINOPHIL NFR BLD AUTO: 1 % (ref 0–6)
ERYTHROCYTE [DISTWIDTH] IN BLOOD BY AUTOMATED COUNT: 17.7 % (ref 11.6–15.1)
HCT VFR BLD AUTO: 46.3 % (ref 34.8–46.1)
HGB BLD-MCNC: 14.9 G/DL (ref 11.5–15.4)
IMM GRANULOCYTES # BLD AUTO: 0.06 THOUSAND/UL (ref 0–0.2)
IMM GRANULOCYTES NFR BLD AUTO: 1 % (ref 0–2)
LYMPHOCYTES # BLD AUTO: 1.29 THOUSANDS/ΜL (ref 0.6–4.47)
LYMPHOCYTES NFR BLD AUTO: 21 % (ref 14–44)
MCH RBC QN AUTO: 26.2 PG (ref 26.8–34.3)
MCHC RBC AUTO-ENTMCNC: 32.2 G/DL (ref 31.4–37.4)
MCV RBC AUTO: 82 FL (ref 82–98)
MONOCYTES # BLD AUTO: 0.62 THOUSAND/ΜL (ref 0.17–1.22)
MONOCYTES NFR BLD AUTO: 10 % (ref 4–12)
NEUTROPHILS # BLD AUTO: 4.04 THOUSANDS/ΜL (ref 1.85–7.62)
NEUTS SEG NFR BLD AUTO: 66 % (ref 43–75)
NRBC BLD AUTO-RTO: 0 /100 WBCS
PLATELET # BLD AUTO: 278 THOUSANDS/UL (ref 149–390)
PMV BLD AUTO: 12.1 FL (ref 8.9–12.7)
RBC # BLD AUTO: 5.68 MILLION/UL (ref 3.81–5.12)
WBC # BLD AUTO: 6.1 THOUSAND/UL (ref 4.31–10.16)

## 2020-06-19 PROCEDURE — 85025 COMPLETE CBC W/AUTO DIFF WBC: CPT

## 2020-06-19 PROCEDURE — 36415 COLL VENOUS BLD VENIPUNCTURE: CPT

## 2020-06-22 RX ORDER — VITAMIN B COMPLEX
TABLET ORAL
Qty: 120 TABLET | Refills: 0 | Status: SHIPPED | OUTPATIENT
Start: 2020-06-22

## 2020-06-26 RX ORDER — ASPIRIN 81 MG/1
TABLET, COATED ORAL
Qty: 30 TABLET | Refills: 10 | OUTPATIENT
Start: 2020-06-26

## 2020-11-02 ENCOUNTER — OFFICE VISIT (OUTPATIENT)
Dept: PODIATRY | Facility: CLINIC | Age: 65
End: 2020-11-02
Payer: MEDICARE

## 2020-11-02 VITALS
WEIGHT: 96 LBS | SYSTOLIC BLOOD PRESSURE: 123 MMHG | RESPIRATION RATE: 16 BRPM | HEART RATE: 70 BPM | HEIGHT: 55 IN | DIASTOLIC BLOOD PRESSURE: 62 MMHG | BODY MASS INDEX: 22.21 KG/M2

## 2020-11-02 DIAGNOSIS — M79.672 PAIN IN BOTH FEET: Primary | ICD-10-CM

## 2020-11-02 DIAGNOSIS — M79.671 PAIN IN BOTH FEET: Primary | ICD-10-CM

## 2020-11-02 DIAGNOSIS — B35.1 ONYCHOMYCOSIS: ICD-10-CM

## 2020-11-02 DIAGNOSIS — M21.961 DEFORMITY OF BOTH FEET: ICD-10-CM

## 2020-11-02 DIAGNOSIS — M21.962 DEFORMITY OF BOTH FEET: ICD-10-CM

## 2020-11-02 DIAGNOSIS — L60.0 INGROWN TOENAIL: ICD-10-CM

## 2020-11-02 DIAGNOSIS — I70.209 ATHEROSCLEROSIS OF ARTERIES OF EXTREMITIES (HCC): ICD-10-CM

## 2020-11-02 DIAGNOSIS — L84 CALLUS: ICD-10-CM

## 2020-11-02 PROCEDURE — 99212 OFFICE O/P EST SF 10 MIN: CPT | Performed by: PODIATRIST

## 2020-12-11 ENCOUNTER — LAB (OUTPATIENT)
Dept: LAB | Facility: CLINIC | Age: 65
End: 2020-12-11
Payer: MEDICARE

## 2020-12-11 ENCOUNTER — TRANSCRIBE ORDERS (OUTPATIENT)
Dept: LAB | Facility: CLINIC | Age: 65
End: 2020-12-11

## 2020-12-11 DIAGNOSIS — D75.1 POLYCYTHEMIA: Primary | ICD-10-CM

## 2020-12-11 DIAGNOSIS — D75.1 POLYCYTHEMIA: ICD-10-CM

## 2020-12-11 LAB
BASOPHILS # BLD AUTO: 0.04 THOUSANDS/ΜL (ref 0–0.1)
BASOPHILS NFR BLD AUTO: 1 % (ref 0–1)
EOSINOPHIL # BLD AUTO: 0.06 THOUSAND/ΜL (ref 0–0.61)
EOSINOPHIL NFR BLD AUTO: 1 % (ref 0–6)
ERYTHROCYTE [DISTWIDTH] IN BLOOD BY AUTOMATED COUNT: 17.2 % (ref 11.6–15.1)
HCT VFR BLD AUTO: 50.2 % (ref 34.8–46.1)
HGB BLD-MCNC: 15.5 G/DL (ref 11.5–15.4)
IMM GRANULOCYTES # BLD AUTO: 0.03 THOUSAND/UL (ref 0–0.2)
IMM GRANULOCYTES NFR BLD AUTO: 1 % (ref 0–2)
LYMPHOCYTES # BLD AUTO: 1.54 THOUSANDS/ΜL (ref 0.6–4.47)
LYMPHOCYTES NFR BLD AUTO: 24 % (ref 14–44)
MCH RBC QN AUTO: 25.6 PG (ref 26.8–34.3)
MCHC RBC AUTO-ENTMCNC: 30.9 G/DL (ref 31.4–37.4)
MCV RBC AUTO: 83 FL (ref 82–98)
MONOCYTES # BLD AUTO: 0.62 THOUSAND/ΜL (ref 0.17–1.22)
MONOCYTES NFR BLD AUTO: 10 % (ref 4–12)
NEUTROPHILS # BLD AUTO: 4.04 THOUSANDS/ΜL (ref 1.85–7.62)
NEUTS SEG NFR BLD AUTO: 63 % (ref 43–75)
NRBC BLD AUTO-RTO: 0 /100 WBCS
PLATELET # BLD AUTO: 254 THOUSANDS/UL (ref 149–390)
PMV BLD AUTO: 11.9 FL (ref 8.9–12.7)
RBC # BLD AUTO: 6.05 MILLION/UL (ref 3.81–5.12)
WBC # BLD AUTO: 6.33 THOUSAND/UL (ref 4.31–10.16)

## 2020-12-11 PROCEDURE — 36415 COLL VENOUS BLD VENIPUNCTURE: CPT

## 2020-12-11 PROCEDURE — 85025 COMPLETE CBC W/AUTO DIFF WBC: CPT

## 2021-01-25 ENCOUNTER — TRANSCRIBE ORDERS (OUTPATIENT)
Dept: LAB | Facility: CLINIC | Age: 66
End: 2021-01-25

## 2021-01-25 ENCOUNTER — OFFICE VISIT (OUTPATIENT)
Dept: PODIATRY | Facility: CLINIC | Age: 66
End: 2021-01-25
Payer: MEDICARE

## 2021-01-25 ENCOUNTER — LAB (OUTPATIENT)
Dept: LAB | Facility: CLINIC | Age: 66
End: 2021-01-25
Payer: MEDICARE

## 2021-01-25 VITALS
HEIGHT: 55 IN | BODY MASS INDEX: 22.21 KG/M2 | SYSTOLIC BLOOD PRESSURE: 123 MMHG | DIASTOLIC BLOOD PRESSURE: 62 MMHG | RESPIRATION RATE: 16 BRPM | WEIGHT: 96 LBS | HEART RATE: 70 BPM

## 2021-01-25 DIAGNOSIS — L60.0 INGROWN TOENAIL: ICD-10-CM

## 2021-01-25 DIAGNOSIS — D75.1 POLYCYTHEMIA, SECONDARY: ICD-10-CM

## 2021-01-25 DIAGNOSIS — I70.209 ATHEROSCLEROSIS OF ARTERIES OF EXTREMITIES (HCC): ICD-10-CM

## 2021-01-25 DIAGNOSIS — E03.9 MYXEDEMA HEART DISEASE: ICD-10-CM

## 2021-01-25 DIAGNOSIS — M79.671 PAIN IN BOTH FEET: ICD-10-CM

## 2021-01-25 DIAGNOSIS — F71 MODERATE INTELLECTUAL DISABILITIES: ICD-10-CM

## 2021-01-25 DIAGNOSIS — E78.2 MIXED HYPERLIPIDEMIA: Primary | ICD-10-CM

## 2021-01-25 DIAGNOSIS — L84 CALLUS: ICD-10-CM

## 2021-01-25 DIAGNOSIS — M21.962 DEFORMITY OF BOTH FEET: Primary | ICD-10-CM

## 2021-01-25 DIAGNOSIS — R09.89 ABNORMAL CHEST SOUNDS: ICD-10-CM

## 2021-01-25 DIAGNOSIS — Q90.9 DOWN'S SYNDROME: ICD-10-CM

## 2021-01-25 DIAGNOSIS — B35.1 ONYCHOMYCOSIS: ICD-10-CM

## 2021-01-25 DIAGNOSIS — M79.672 PAIN IN BOTH FEET: ICD-10-CM

## 2021-01-25 DIAGNOSIS — I51.9 MYXEDEMA HEART DISEASE: ICD-10-CM

## 2021-01-25 DIAGNOSIS — M21.961 DEFORMITY OF BOTH FEET: Primary | ICD-10-CM

## 2021-01-25 LAB
ALBUMIN SERPL BCP-MCNC: 3.3 G/DL (ref 3.5–5)
ALP SERPL-CCNC: 90 U/L (ref 46–116)
ALT SERPL W P-5'-P-CCNC: 79 U/L (ref 12–78)
ANION GAP SERPL CALCULATED.3IONS-SCNC: 3 MMOL/L (ref 4–13)
AST SERPL W P-5'-P-CCNC: 34 U/L (ref 5–45)
BILIRUB SERPL-MCNC: 0.33 MG/DL (ref 0.2–1)
BUN SERPL-MCNC: 20 MG/DL (ref 5–25)
CALCIUM ALBUM COR SERPL-MCNC: 10.1 MG/DL (ref 8.3–10.1)
CALCIUM SERPL-MCNC: 9.5 MG/DL (ref 8.3–10.1)
CHLORIDE SERPL-SCNC: 108 MMOL/L (ref 100–108)
CHOLEST SERPL-MCNC: 177 MG/DL (ref 50–200)
CO2 SERPL-SCNC: 32 MMOL/L (ref 21–32)
CREAT SERPL-MCNC: 0.81 MG/DL (ref 0.6–1.3)
GFR SERPL CREATININE-BSD FRML MDRD: 78 ML/MIN/1.73SQ M
GLUCOSE P FAST SERPL-MCNC: 107 MG/DL (ref 65–99)
HDLC SERPL-MCNC: 64 MG/DL
LDLC SERPL CALC-MCNC: 80 MG/DL (ref 0–100)
NONHDLC SERPL-MCNC: 113 MG/DL
POTASSIUM SERPL-SCNC: 4.8 MMOL/L (ref 3.5–5.3)
PROT SERPL-MCNC: 7.2 G/DL (ref 6.4–8.2)
SODIUM SERPL-SCNC: 143 MMOL/L (ref 136–145)
TRIGL SERPL-MCNC: 164 MG/DL

## 2021-01-25 PROCEDURE — 99212 OFFICE O/P EST SF 10 MIN: CPT | Performed by: PODIATRIST

## 2021-01-25 PROCEDURE — 80061 LIPID PANEL: CPT

## 2021-01-25 PROCEDURE — 80053 COMPREHEN METABOLIC PANEL: CPT

## 2021-01-25 PROCEDURE — 36415 COLL VENOUS BLD VENIPUNCTURE: CPT

## 2021-03-30 NOTE — PROGRESS NOTES
Assessment/Plan:     Aseptic debridement and planning of nails x10 and manually and mechanically  Daily foot checks monitor for signs of infection  All calluses debrided without pain or complication  Patient educated on proper shoe gear, discussed with aide type of inserts and heel to help the patient deformity, patient is not a candidate for surgical management at this time  Follow-up 3 months      Diagnoses and all orders for this visit:     Atherosclerosis of arteries of extremities (HCC)     Onychomycosis     Pain in both feet    Callus formation  The subluxed hammertoes bilateral, HAV deformity bilateral,            Subjective:       Patient ID: Sandrine Matias is a 60 y o  female      Patient was referred from [facility] for foot care  Patient has thick painful nails that hurt when walking wearing shoes  Aids have not noticed any redness or signs of infection  She has pain in her feet with ambulation  No history of trauma    She has pain with shoe wear         Medical History        Past Medical History:   Diagnosis Date    Dementia      Depression with anxiety       resolved: 10/12/2017    Disease of thyroid gland      Diverticulosis      Down's syndrome      Fatty liver      Hyperkalemia       last assessed: 7/15/2014    Hyperlipidemia      Hypothyroidism       last assessed: 8/31/2017    Mental retardation      Osteoporosis      Psychiatric disorder                 Current Outpatient Medications:     alendronate (FOSAMAX) 70 mg tablet, Take 1 tablet (70 mg total) by mouth every 7 days, Disp: 12 tablet, Rfl: 3    carbamide peroxide (DEBROX) 6 5 % otic solution, Administer 5 drops into both ears 2 (two) times a day To use 1 week out of each month, Disp: 15 mL, Rfl: 0    cholecalciferol (VITAMIN D3) 1,000 units tablet, TAKE 1 TABLET BY MOUTH TWICE A DAY (8AM&8PM), Disp: 60 tablet, Rfl: 10    fluvoxaMINE (LUVOX) 25 MG tablet, Take 25 mg by mouth 2 (two) times a day , Disp: , Rfl:    levothyroxine (SYNTHROID) 75 mcg tablet, Take 1 tablet (75 mcg total) by mouth daily in the early morning At 7 am, Disp: 30 tablet, Rfl: 5    LORazepam (ATIVAN) 0 5 mg tablet, Take 0 5 mg by mouth daily as needed for anxiety  , Disp: , Rfl:     memantine (NAMENDA) 5 mg tablet, Take 1 tablet (5 mg total) by mouth daily, Disp: 90 tablet, Rfl: 3    multivitamin (THERAGRAN) TABS, Take 1 tablet by mouth daily, Disp: 90 tablet, Rfl: 3    simvastatin (ZOCOR) 20 mg tablet, TAKE 1 TABLET BY MOUTH EVERY DAY (8AM) -[facility], Disp: 30 tablet, Rfl: 4     Surgical History   No past surgical history on file         No Known Allergies         Patient Active Problem List   Diagnosis    Atherosclerosis of arteries of extremities (HCC)    Callus    Deformity of ankle and foot, acquired    Difficulty in walking    Pain in extremity    Trisomy 21, Down syndrome         Review of Systems   Constitutional: Negative  HENT: Negative  Eyes: Negative  Respiratory: Negative  Cardiovascular: Negative  Gastrointestinal: Negative  Endocrine: Negative  Genitourinary: Negative  Musculoskeletal: Positive for arthralgias  Skin: Positive for color change  Allergic/Immunologic: Negative  Neurological: Negative  Hematological: Negative      Psychiatric/Behavioral: Negative            Objective:  Patient's shoes and socks were removed, feet examined       /62   Ht 4' 3 75" (1 314 m)   Wt 43 5 kg (96 lb)   BMI 25 20 kg/m²         Foot Exam     General  General Appearance: appears stated age and healthy   Orientation: alert and oriented to person, place, and time   Affect: appropriate   Gait: unimpaired         Right Foot/Ankle      Inspection and Palpation  Arch: pes planus  Hammertoes: second toe, fifth toe, fourth toe and third toe  Hallux valgus: yes  Skin Exam: callus, dry skin and skin changes; callus submetatarsal 1     Neurovascular  Dorsalis pedis: 1+  Posterior tibial: absent        Left Foot/Ankle       Inspection and Palpation  Arch: pes planus  Hammertoes: second toe, fifth toe, fourth toe and third toe  Hallux valgus: yes  Skin Exam: dry skin and skin changes; callus submetatarsal 1     Neurovascular  Dorsalis pedis: 1+  Posterior tibial: absent              Right Foot/Ankle   Right Foot Inspection  Skin Exam: dry skin, callus and callus                                 Vascular     The right DP pulse is 1+  The right PT pulse is absent  Right Toe  - Comprehensive Exam  Arch: pes planus  Hammertoes: second toe, fifth toe, fourth toe and third toe  Hallux valgus: yes     Left Foot/Ankle  Left Foot Inspection  Skin Exam: dry skin                                             Vascular     The left DP pulse is 1+  The left PT pulse is absent  Left Toe  - Comprehensive Exam  Arch: pes planus  Hammertoes: second toe, fifth toe, fourth toe and third toe  Hallux valgus: yes          Physical Exam   Cardiovascular:   Pulses:       Dorsalis pedis pulses are 1+ on the right side, and 1+ on the left side  Posterior tibial pulses are Absent on the right side, and Absent on the left side  Feet:   Right Foot:   Skin Integrity: Positive for callus and dry skin  Left Foot:   Skin Integrity: Positive for dry skin  Nursing note and vitals reviewed          Procedures         Large bunion bilateral  Rigidly contracted hammertoes 2 through 5 bilateral, subluxed bilateral 2nd digit, with overlapping hallux   Skin hairless and atrophic  All nails thick, discolored, dystrophic positive subungual debris positive pain on palpation  Hallux nails demonstrates severe onychocryptosis  There thick with malodor and debris    Distal lysis noted  Hyperpigmentation and venous stasis bilateral  Negative calf tenderness  No open wounds or signs of infection  Moderate edema bilateral  Significant nail fungus bilateral Nails are thickened discolored brittle  Skin hairless and atrophic bilateral  Moderate edema bilateral moderate hyperpigmentation lower legs bilateral  No open wounds or signs of infection

## 2021-04-19 ENCOUNTER — OFFICE VISIT (OUTPATIENT)
Dept: PODIATRY | Facility: CLINIC | Age: 66
End: 2021-04-19
Payer: MEDICARE

## 2021-04-19 VITALS
WEIGHT: 96 LBS | DIASTOLIC BLOOD PRESSURE: 62 MMHG | HEART RATE: 70 BPM | RESPIRATION RATE: 17 BRPM | SYSTOLIC BLOOD PRESSURE: 123 MMHG | BODY MASS INDEX: 22.21 KG/M2 | HEIGHT: 55 IN

## 2021-04-19 DIAGNOSIS — I70.209 ATHEROSCLEROSIS OF ARTERIES OF EXTREMITIES (HCC): ICD-10-CM

## 2021-04-19 DIAGNOSIS — L60.0 INGROWN TOENAIL: ICD-10-CM

## 2021-04-19 DIAGNOSIS — L84 CALLUS: ICD-10-CM

## 2021-04-19 DIAGNOSIS — M79.672 PAIN IN BOTH FEET: ICD-10-CM

## 2021-04-19 DIAGNOSIS — M79.671 PAIN IN BOTH FEET: ICD-10-CM

## 2021-04-19 DIAGNOSIS — B35.1 ONYCHOMYCOSIS: ICD-10-CM

## 2021-04-19 DIAGNOSIS — M21.962 DEFORMITY OF BOTH FEET: Primary | ICD-10-CM

## 2021-04-19 DIAGNOSIS — M21.961 DEFORMITY OF BOTH FEET: Primary | ICD-10-CM

## 2021-04-19 PROCEDURE — 99212 OFFICE O/P EST SF 10 MIN: CPT | Performed by: PODIATRIST

## 2021-04-27 ENCOUNTER — TELEPHONE (OUTPATIENT)
Dept: FAMILY MEDICINE CLINIC | Facility: CLINIC | Age: 66
End: 2021-04-27

## 2021-04-27 NOTE — TELEPHONE ENCOUNTER
Called to schedule Medicare Annual wellness  Spoke with patients caretaker  She advised patient is no longer going to [facility]  She has found a new PCP

## 2021-05-14 NOTE — TELEPHONE ENCOUNTER
05/13/21 8:22 PM     Thank you for your request  Your request has been received, reviewed, and the patient chart updated  The PCP has successfully been removed with a patient attribution note  This message will now be completed      Thank you  Felix Liao

## 2021-06-10 ENCOUNTER — APPOINTMENT (OUTPATIENT)
Dept: LAB | Facility: CLINIC | Age: 66
End: 2021-06-10
Payer: MEDICARE

## 2021-06-10 ENCOUNTER — TRANSCRIBE ORDERS (OUTPATIENT)
Dept: LAB | Facility: CLINIC | Age: 66
End: 2021-06-10

## 2021-06-10 DIAGNOSIS — D76.1: Primary | ICD-10-CM

## 2021-06-10 DIAGNOSIS — D76.1: ICD-10-CM

## 2021-06-10 LAB
BASOPHILS # BLD AUTO: 0.07 THOUSANDS/ΜL (ref 0–0.1)
BASOPHILS NFR BLD AUTO: 1 % (ref 0–1)
EOSINOPHIL # BLD AUTO: 0.05 THOUSAND/ΜL (ref 0–0.61)
EOSINOPHIL NFR BLD AUTO: 1 % (ref 0–6)
ERYTHROCYTE [DISTWIDTH] IN BLOOD BY AUTOMATED COUNT: 16.3 % (ref 11.6–15.1)
HCT VFR BLD AUTO: 47.4 % (ref 34.8–46.1)
HGB BLD-MCNC: 15.1 G/DL (ref 11.5–15.4)
IMM GRANULOCYTES # BLD AUTO: 0.05 THOUSAND/UL (ref 0–0.2)
IMM GRANULOCYTES NFR BLD AUTO: 1 % (ref 0–2)
LYMPHOCYTES # BLD AUTO: 1.65 THOUSANDS/ΜL (ref 0.6–4.47)
LYMPHOCYTES NFR BLD AUTO: 25 % (ref 14–44)
MCH RBC QN AUTO: 26.6 PG (ref 26.8–34.3)
MCHC RBC AUTO-ENTMCNC: 31.9 G/DL (ref 31.4–37.4)
MCV RBC AUTO: 84 FL (ref 82–98)
MONOCYTES # BLD AUTO: 0.49 THOUSAND/ΜL (ref 0.17–1.22)
MONOCYTES NFR BLD AUTO: 7 % (ref 4–12)
NEUTROPHILS # BLD AUTO: 4.29 THOUSANDS/ΜL (ref 1.85–7.62)
NEUTS SEG NFR BLD AUTO: 65 % (ref 43–75)
NRBC BLD AUTO-RTO: 0 /100 WBCS
PLATELET # BLD AUTO: 248 THOUSANDS/UL (ref 149–390)
PMV BLD AUTO: 12.1 FL (ref 8.9–12.7)
RBC # BLD AUTO: 5.67 MILLION/UL (ref 3.81–5.12)
WBC # BLD AUTO: 6.6 THOUSAND/UL (ref 4.31–10.16)

## 2021-06-10 PROCEDURE — 36415 COLL VENOUS BLD VENIPUNCTURE: CPT

## 2021-06-10 PROCEDURE — 85025 COMPLETE CBC W/AUTO DIFF WBC: CPT

## 2021-07-12 NOTE — PROGRESS NOTES
Assessment/Plan:     Aseptic debridement and planning of nails x10 and manually and mechanically  Daily foot checks monitor for signs of infection  All calluses debrided without pain or complication  Patient educated on proper shoe gear, discussed with aide type of inserts and heel to help the patient deformity, patient is not a candidate for surgical management at this time  Follow-up 3 months      Diagnoses and all orders for this visit:     Atherosclerosis of arteries of extremities (HCC)     Onychomycosis     Pain in both feet    Callus formation  The subluxed hammertoes bilateral, HAV deformity bilateral,            Subjective:       Patient ID: Catheline Dance is a 60 y o  female      Patient was referred from [facility] for foot care  Patient has thick painful nails that hurt when walking wearing shoes  Aids have not noticed any redness or signs of infection  She has pain in her feet with ambulation  No history of trauma    She has pain with shoe wear         Medical History        Past Medical History:   Diagnosis Date    Dementia      Depression with anxiety       resolved: 10/12/2017    Disease of thyroid gland      Diverticulosis      Down's syndrome      Fatty liver      Hyperkalemia       last assessed: 7/15/2014    Hyperlipidemia      Hypothyroidism       last assessed: 8/31/2017    Mental retardation      Osteoporosis      Psychiatric disorder                 Current Outpatient Medications:     alendronate (FOSAMAX) 70 mg tablet, Take 1 tablet (70 mg total) by mouth every 7 days, Disp: 12 tablet, Rfl: 3    carbamide peroxide (DEBROX) 6 5 % otic solution, Administer 5 drops into both ears 2 (two) times a day To use 1 week out of each month, Disp: 15 mL, Rfl: 0    cholecalciferol (VITAMIN D3) 1,000 units tablet, TAKE 1 TABLET BY MOUTH TWICE A DAY (8AM&8PM), Disp: 60 tablet, Rfl: 10    fluvoxaMINE (LUVOX) 25 MG tablet, Take 25 mg by mouth 2 (two) times a day , Disp: , Rfl:    levothyroxine (SYNTHROID) 75 mcg tablet, Take 1 tablet (75 mcg total) by mouth daily in the early morning At 7 am, Disp: 30 tablet, Rfl: 5    LORazepam (ATIVAN) 0 5 mg tablet, Take 0 5 mg by mouth daily as needed for anxiety  , Disp: , Rfl:     memantine (NAMENDA) 5 mg tablet, Take 1 tablet (5 mg total) by mouth daily, Disp: 90 tablet, Rfl: 3    multivitamin (THERAGRAN) TABS, Take 1 tablet by mouth daily, Disp: 90 tablet, Rfl: 3    simvastatin (ZOCOR) 20 mg tablet, TAKE 1 TABLET BY MOUTH EVERY DAY (8AM) -[facility], Disp: 30 tablet, Rfl: 4     Surgical History   No past surgical history on file         No Known Allergies         Patient Active Problem List   Diagnosis    Atherosclerosis of arteries of extremities (HCC)    Callus    Deformity of ankle and foot, acquired    Difficulty in walking    Pain in extremity    Trisomy 21, Down syndrome         Review of Systems   Constitutional: Negative  HENT: Negative  Eyes: Negative  Respiratory: Negative  Cardiovascular: Negative  Gastrointestinal: Negative  Endocrine: Negative  Genitourinary: Negative  Musculoskeletal: Positive for arthralgias  Skin: Positive for color change  Allergic/Immunologic: Negative  Neurological: Negative  Hematological: Negative      Psychiatric/Behavioral: Negative            Objective:  Patient's shoes and socks were removed, feet examined       /62   Ht 4' 3 75" (1 314 m)   Wt 43 5 kg (96 lb)   BMI 25 20 kg/m²         Foot Exam     General  General Appearance: appears stated age and healthy   Orientation: alert and oriented to person, place, and time   Affect: appropriate   Gait: unimpaired         Right Foot/Ankle      Inspection and Palpation  Arch: pes planus  Hammertoes: second toe, fifth toe, fourth toe and third toe  Hallux valgus: yes  Skin Exam: callus, dry skin and skin changes; callus submetatarsal 1     Neurovascular  Dorsalis pedis: 1+  Posterior tibial: absent        Left Foot/Ankle       Inspection and Palpation  Arch: pes planus  Hammertoes: second toe, fifth toe, fourth toe and third toe  Hallux valgus: yes  Skin Exam: dry skin and skin changes; callus submetatarsal 1     Neurovascular  Dorsalis pedis: 1+  Posterior tibial: absent              Right Foot/Ankle   Right Foot Inspection  Skin Exam: dry skin, callus and callus                                 Vascular     The right DP pulse is 1+  The right PT pulse is absent  Right Toe  - Comprehensive Exam  Arch: pes planus  Hammertoes: second toe, fifth toe, fourth toe and third toe  Hallux valgus: yes     Left Foot/Ankle  Left Foot Inspection  Skin Exam: dry skin                                             Vascular     The left DP pulse is 1+  The left PT pulse is absent  Left Toe  - Comprehensive Exam  Arch: pes planus  Hammertoes: second toe, fifth toe, fourth toe and third toe  Hallux valgus: yes          Physical Exam   Cardiovascular:   Pulses:       Dorsalis pedis pulses are 1+ on the right side, and 1+ on the left side  Posterior tibial pulses are Absent on the right side, and Absent on the left side  Feet:   Right Foot:   Skin Integrity: Positive for callus and dry skin  Left Foot:   Skin Integrity: Positive for dry skin  Nursing note and vitals reviewed          Procedures         Large bunion bilateral  Rigidly contracted hammertoes 2 through 5 bilateral, subluxed bilateral 2nd digit, with overlapping hallux   Skin hairless and atrophic  All nails thick, discolored, dystrophic positive subungual debris positive pain on palpation  Hallux nails demonstrates severe onychocryptosis  There thick with malodor and debris    Distal lysis noted  Hyperpigmentation and venous stasis bilateral  Negative calf tenderness  No open wounds or signs of infection  Moderate edema bilateral  Significant nail fungus bilateral Nails are thickened discolored brittle  Skin hairless and atrophic bilateral  Moderate edema bilateral moderate hyperpigmentation lower legs bilateral  No open wounds or signs of infection

## 2021-08-26 ENCOUNTER — OFFICE VISIT (OUTPATIENT)
Dept: PODIATRY | Facility: CLINIC | Age: 66
End: 2021-08-26
Payer: MEDICARE

## 2021-08-26 VITALS
RESPIRATION RATE: 17 BRPM | SYSTOLIC BLOOD PRESSURE: 123 MMHG | HEIGHT: 55 IN | WEIGHT: 96 LBS | DIASTOLIC BLOOD PRESSURE: 62 MMHG | HEART RATE: 70 BPM | BODY MASS INDEX: 22.21 KG/M2

## 2021-08-26 DIAGNOSIS — I70.209 ATHEROSCLEROSIS OF ARTERIES OF EXTREMITIES (HCC): ICD-10-CM

## 2021-08-26 DIAGNOSIS — B35.1 ONYCHOMYCOSIS: ICD-10-CM

## 2021-08-26 DIAGNOSIS — M20.12 VALGUS DEFORMITY OF BOTH GREAT TOES: ICD-10-CM

## 2021-08-26 DIAGNOSIS — M21.962 DEFORMITY OF BOTH FEET: Primary | ICD-10-CM

## 2021-08-26 DIAGNOSIS — L60.0 INGROWN TOENAIL: ICD-10-CM

## 2021-08-26 DIAGNOSIS — M20.11 VALGUS DEFORMITY OF BOTH GREAT TOES: ICD-10-CM

## 2021-08-26 DIAGNOSIS — M79.671 PAIN IN BOTH FEET: ICD-10-CM

## 2021-08-26 DIAGNOSIS — M79.672 PAIN IN BOTH FEET: ICD-10-CM

## 2021-08-26 DIAGNOSIS — L84 CALLUS: ICD-10-CM

## 2021-08-26 DIAGNOSIS — M21.961 DEFORMITY OF BOTH FEET: Primary | ICD-10-CM

## 2021-08-26 PROCEDURE — 11721 DEBRIDE NAIL 6 OR MORE: CPT | Performed by: PODIATRIST

## 2021-11-11 ENCOUNTER — OFFICE VISIT (OUTPATIENT)
Dept: PODIATRY | Facility: CLINIC | Age: 66
End: 2021-11-11
Payer: MEDICARE

## 2021-11-11 VITALS
RESPIRATION RATE: 17 BRPM | HEIGHT: 55 IN | SYSTOLIC BLOOD PRESSURE: 123 MMHG | DIASTOLIC BLOOD PRESSURE: 62 MMHG | HEART RATE: 70 BPM | WEIGHT: 96 LBS | BODY MASS INDEX: 22.21 KG/M2

## 2021-11-11 DIAGNOSIS — M21.961 DEFORMITY OF BOTH FEET: Primary | ICD-10-CM

## 2021-11-11 DIAGNOSIS — I70.209 ATHEROSCLEROSIS OF ARTERIES OF EXTREMITIES (HCC): ICD-10-CM

## 2021-11-11 DIAGNOSIS — M79.672 PAIN IN BOTH FEET: ICD-10-CM

## 2021-11-11 DIAGNOSIS — M79.671 PAIN IN BOTH FEET: ICD-10-CM

## 2021-11-11 DIAGNOSIS — M20.12 VALGUS DEFORMITY OF BOTH GREAT TOES: ICD-10-CM

## 2021-11-11 DIAGNOSIS — M20.11 VALGUS DEFORMITY OF BOTH GREAT TOES: ICD-10-CM

## 2021-11-11 DIAGNOSIS — B35.1 ONYCHOMYCOSIS: ICD-10-CM

## 2021-11-11 DIAGNOSIS — M21.962 DEFORMITY OF BOTH FEET: Primary | ICD-10-CM

## 2021-11-11 PROCEDURE — 99212 OFFICE O/P EST SF 10 MIN: CPT | Performed by: PODIATRIST

## 2022-01-20 ENCOUNTER — OFFICE VISIT (OUTPATIENT)
Dept: PODIATRY | Facility: CLINIC | Age: 67
End: 2022-01-20
Payer: MEDICARE

## 2022-01-20 VITALS — HEIGHT: 55 IN | WEIGHT: 96 LBS | RESPIRATION RATE: 17 BRPM | BODY MASS INDEX: 22.21 KG/M2

## 2022-01-20 DIAGNOSIS — M79.672 PAIN IN BOTH FEET: ICD-10-CM

## 2022-01-20 DIAGNOSIS — M20.11 VALGUS DEFORMITY OF BOTH GREAT TOES: ICD-10-CM

## 2022-01-20 DIAGNOSIS — M20.12 VALGUS DEFORMITY OF BOTH GREAT TOES: ICD-10-CM

## 2022-01-20 DIAGNOSIS — B35.1 ONYCHOMYCOSIS: ICD-10-CM

## 2022-01-20 DIAGNOSIS — L60.0 INGROWN TOENAIL: ICD-10-CM

## 2022-01-20 DIAGNOSIS — I70.209 ATHEROSCLEROSIS OF ARTERIES OF EXTREMITIES (HCC): ICD-10-CM

## 2022-01-20 DIAGNOSIS — M79.671 PAIN IN BOTH FEET: ICD-10-CM

## 2022-01-20 DIAGNOSIS — M21.962 DEFORMITY OF BOTH FEET: Primary | ICD-10-CM

## 2022-01-20 DIAGNOSIS — M21.961 DEFORMITY OF BOTH FEET: Primary | ICD-10-CM

## 2022-01-20 DIAGNOSIS — L84 CALLUS: ICD-10-CM

## 2022-01-20 PROCEDURE — 99212 OFFICE O/P EST SF 10 MIN: CPT | Performed by: PODIATRIST

## 2022-01-20 NOTE — PROGRESS NOTES
Assessment/Plan:    Patient opted out of oral and topical anti fungal medications at this time, all onychomycotic dystrophic nails debrided using sterile Nipper and electrical junior to patient tolerance, Betadine applied, patient educated on daily foot hygiene for the management of fungal infection  Discussed with aide the use of wider toe box shoes to accommodate for patient bunion deformity and pain to the medial eminence, patient also educated on the use of emollient to avoid dry skin and skin fissures for relief   Diagnoses and all orders for this visit:    Deformity of both feet    Valgus deformity of both great toes    Atherosclerosis of arteries of extremities (HCC)    Pain in both feet    Onychomycosis    Ingrown toenail    Callus          Subjective:      Patient ID: Jose Raymond is a 61 y o  female  61-year-old female with past medical history significant of developmental neurological condition, 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, patient unable to self treat due to her conditions, patient complains of painful elongated thickened dystrophic toenails, also report bunion deformity that causing pain upon ambulation and in shoe gear  The following portions of the patient's history were reviewed and updated as appropriate: allergies, current medications, past family history, past medical history, past social history, past surgical history and problem list     Review of Systems   Constitutional: Negative  Respiratory: Negative  Cardiovascular: Negative  Musculoskeletal: Positive for arthralgias and joint swelling  Skin: Positive for color change                 Historical Information   Past Medical History:   Diagnosis Date    Dementia ([facility] [address] )     Depression with anxiety     resolved: 10/12/2017    Disease of thyroid gland     Diverticulosis     Down's syndrome     Fatty liver     Hyperkalemia     last assessed: 7/15/2014    Hyperlipidemia     Hypothyroidism     last assessed: 8/31/2017    Mental retardation     Osteoporosis     Psychiatric disorder      No past surgical history on file  Social History   Social History     Substance and Sexual Activity   Alcohol Use No     Social History     Substance and Sexual Activity   Drug Use No     Social History     Tobacco Use   Smoking Status Never Smoker   Smokeless Tobacco Never Used     Family History:   Family History   Problem Relation Age of Onset    No Known Problems Mother     No Known Problems Family        Meds/Allergies   all medications and allergies reviewed  No Known Allergies    Objective:      Resp 17   Ht 4' 3 5" (1 308 m)   Wt 43 5 kg (96 lb)   BMI 25 45 kg/m²          Physical Exam  Constitutional:       General: She is not in acute distress  Appearance: She is well-developed  She is not ill-appearing, toxic-appearing or diaphoretic  HENT:      Head: Normocephalic  Cardiovascular:      Pulses:           Dorsalis pedis pulses are 1+ on the right side and 1+ on the left side  Posterior tibial pulses are 0 on the right side and 0 on the left side  Comments: Palpable DP pulse, nonpalpable PT pulse, CFT is less than 3 seconds, temperature gradient within normal limit, a trophic skin changes noted with skin thinning in shiny, patient report occasional claudication to bilateral calf, localized edema foot and ankle Q9  Pulmonary:      Effort: Pulmonary effort is normal    Musculoskeletal:         General: Tenderness and deformity present  Comments: Pes planus type foot pain on palpation and range of motion of the 1st MPJ, metatarsal heads bilateral foot, pain on palpation on range of motion of the ST joint, crepitus noted in midfoot joint  Severe HAV deformity bilateral with overlapping hallux bilateral hammertoe deformity   Skin:     General: Skin is dry  Capillary Refill: Capillary refill takes 2 to 3 seconds        Comments: Trophic skin changes bilateral foot and ankle, alternating hypopigmentation and hyperpigmentation patches around the foot and ankle on anterior leg, skin is shiny and thin, absent hair growth, atrophy of fat pad  Multiple callus formation plantar foot painful on palpation to plantar heel bilateral, skin lines absent, hyperkeratotic rim and central atrophy noted  Thickened dystrophic discolored toenails of bilateral hallux, 5th digit bilateral, subungual debris and painful upon palpation, all other nails show signs of dystrophy with no subungual debris, all nails have malodor  Neurological:      Mental Status: She is alert and oriented to person, place, and time  Sensory: Sensory deficit present  Motor: Atrophy present        Deep Tendon Reflexes: Reflexes abnormal       Foot Exam    Right Foot/Ankle     Neurovascular  Dorsalis pedis: 1+  Posterior tibial: 0      Left Foot/Ankle      Neurovascular  Dorsalis pedis: 1+  Posterior tibial: 0

## 2022-04-14 ENCOUNTER — OFFICE VISIT (OUTPATIENT)
Dept: PODIATRY | Facility: CLINIC | Age: 67
End: 2022-04-14
Payer: MEDICARE

## 2022-04-14 VITALS — BODY MASS INDEX: 22.21 KG/M2 | HEART RATE: 70 BPM | RESPIRATION RATE: 17 BRPM | HEIGHT: 55 IN | WEIGHT: 96 LBS

## 2022-04-14 DIAGNOSIS — M20.12 VALGUS DEFORMITY OF BOTH GREAT TOES: ICD-10-CM

## 2022-04-14 DIAGNOSIS — B35.1 ONYCHOMYCOSIS: ICD-10-CM

## 2022-04-14 DIAGNOSIS — M21.961 DEFORMITY OF BOTH FEET: ICD-10-CM

## 2022-04-14 DIAGNOSIS — L84 CALLUS: ICD-10-CM

## 2022-04-14 DIAGNOSIS — M79.672 PAIN IN BOTH FEET: ICD-10-CM

## 2022-04-14 DIAGNOSIS — M20.11 VALGUS DEFORMITY OF BOTH GREAT TOES: ICD-10-CM

## 2022-04-14 DIAGNOSIS — I70.209 ATHEROSCLEROSIS OF ARTERIES OF EXTREMITIES (HCC): Primary | ICD-10-CM

## 2022-04-14 DIAGNOSIS — M79.671 PAIN IN BOTH FEET: ICD-10-CM

## 2022-04-14 DIAGNOSIS — L60.0 INGROWN TOENAIL: ICD-10-CM

## 2022-04-14 DIAGNOSIS — M21.962 DEFORMITY OF BOTH FEET: ICD-10-CM

## 2022-04-14 PROCEDURE — 99212 OFFICE O/P EST SF 10 MIN: CPT | Performed by: PODIATRIST

## 2022-04-22 ENCOUNTER — APPOINTMENT (OUTPATIENT)
Dept: LAB | Facility: CLINIC | Age: 67
End: 2022-04-22
Payer: MEDICARE

## 2022-04-22 DIAGNOSIS — R41.82 ALTERED MENTAL STATUS, UNSPECIFIED ALTERED MENTAL STATUS TYPE: ICD-10-CM

## 2022-04-22 LAB
ALBUMIN SERPL BCP-MCNC: 3.4 G/DL (ref 3.5–5)
ALP SERPL-CCNC: 67 U/L (ref 46–116)
ALT SERPL W P-5'-P-CCNC: 41 U/L (ref 12–78)
ANION GAP SERPL CALCULATED.3IONS-SCNC: 4 MMOL/L (ref 4–13)
AST SERPL W P-5'-P-CCNC: 22 U/L (ref 5–45)
BASOPHILS # BLD AUTO: 0.06 THOUSANDS/ΜL (ref 0–0.1)
BASOPHILS NFR BLD AUTO: 1 % (ref 0–1)
BILIRUB SERPL-MCNC: 0.33 MG/DL (ref 0.2–1)
BUN SERPL-MCNC: 15 MG/DL (ref 5–25)
CALCIUM ALBUM COR SERPL-MCNC: 9.8 MG/DL (ref 8.3–10.1)
CALCIUM SERPL-MCNC: 9.3 MG/DL (ref 8.3–10.1)
CHLORIDE SERPL-SCNC: 104 MMOL/L (ref 100–108)
CO2 SERPL-SCNC: 30 MMOL/L (ref 21–32)
CREAT SERPL-MCNC: 0.94 MG/DL (ref 0.6–1.3)
EOSINOPHIL # BLD AUTO: 0.07 THOUSAND/ΜL (ref 0–0.61)
EOSINOPHIL NFR BLD AUTO: 1 % (ref 0–6)
ERYTHROCYTE [DISTWIDTH] IN BLOOD BY AUTOMATED COUNT: 16.8 % (ref 11.6–15.1)
GFR SERPL CREATININE-BSD FRML MDRD: 64 ML/MIN/1.73SQ M
GLUCOSE SERPL-MCNC: 109 MG/DL (ref 65–140)
HCT VFR BLD AUTO: 48.7 % (ref 34.8–46.1)
HGB BLD-MCNC: 15.4 G/DL (ref 11.5–15.4)
IMM GRANULOCYTES # BLD AUTO: 0.03 THOUSAND/UL (ref 0–0.2)
IMM GRANULOCYTES NFR BLD AUTO: 0 % (ref 0–2)
LYMPHOCYTES # BLD AUTO: 1.62 THOUSANDS/ΜL (ref 0.6–4.47)
LYMPHOCYTES NFR BLD AUTO: 23 % (ref 14–44)
MCH RBC QN AUTO: 26.3 PG (ref 26.8–34.3)
MCHC RBC AUTO-ENTMCNC: 31.6 G/DL (ref 31.4–37.4)
MCV RBC AUTO: 83 FL (ref 82–98)
MONOCYTES # BLD AUTO: 0.56 THOUSAND/ΜL (ref 0.17–1.22)
MONOCYTES NFR BLD AUTO: 8 % (ref 4–12)
NEUTROPHILS # BLD AUTO: 4.67 THOUSANDS/ΜL (ref 1.85–7.62)
NEUTS SEG NFR BLD AUTO: 67 % (ref 43–75)
NRBC BLD AUTO-RTO: 0 /100 WBCS
PLATELET # BLD AUTO: 247 THOUSANDS/UL (ref 149–390)
PMV BLD AUTO: 12.3 FL (ref 8.9–12.7)
POTASSIUM SERPL-SCNC: 3.7 MMOL/L (ref 3.5–5.3)
PROT SERPL-MCNC: 7.3 G/DL (ref 6.4–8.2)
RBC # BLD AUTO: 5.86 MILLION/UL (ref 3.81–5.12)
SODIUM SERPL-SCNC: 138 MMOL/L (ref 136–145)
T4 FREE SERPL-MCNC: 0.87 NG/DL (ref 0.76–1.46)
TSH SERPL DL<=0.05 MIU/L-ACNC: 4.92 UIU/ML (ref 0.45–4.5)
WBC # BLD AUTO: 7.01 THOUSAND/UL (ref 4.31–10.16)

## 2022-04-22 PROCEDURE — 85025 COMPLETE CBC W/AUTO DIFF WBC: CPT

## 2022-04-22 PROCEDURE — 36415 COLL VENOUS BLD VENIPUNCTURE: CPT

## 2022-04-22 PROCEDURE — 80053 COMPREHEN METABOLIC PANEL: CPT

## 2022-04-22 PROCEDURE — 84443 ASSAY THYROID STIM HORMONE: CPT

## 2022-04-22 PROCEDURE — 84439 ASSAY OF FREE THYROXINE: CPT

## 2022-07-01 NOTE — PROGRESS NOTES
Assessment/Plan:    Patient opted out of oral and topical anti fungal medications at this time, all onychomycotic dystrophic nails debrided using sterile Nipper and electrical junior to patient tolerance, Betadine applied, patient educated on daily foot hygiene for the management of fungal infection  Discussed with aide the use of wider toe box shoes to accommodate for patient bunion deformity and pain to the medial eminence, patient also educated on the use of emollient to avoid dry skin and skin fissures for relief   Diagnoses and all orders for this visit:    Atherosclerosis of arteries of extremities (HCC)    Valgus deformity of both great toes    Deformity of both feet    Pain in both feet    Onychomycosis    Ingrown toenail    Callus          Subjective:      Patient ID: Jessica Ricardo is a 74 y o  female  44-year-old female with past medical history significant of developmental neurological condition, 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, patient unable to self treat due to her conditions, patient complains of painful elongated thickened dystrophic toenails, also report bunion deformity that causing pain upon ambulation and in shoe gear  The following portions of the patient's history were reviewed and updated as appropriate: allergies, current medications, past family history, past medical history, past social history, past surgical history and problem list     Review of Systems   Constitutional: Negative  Respiratory: Negative  Cardiovascular: Negative  Musculoskeletal: Positive for arthralgias and joint swelling  Skin: Positive for color change                 Historical Information   Past Medical History:   Diagnosis Date    [facility]ementia ([facility] [address] [facility]     Depression with anxiety     resolved: 10/12/2017    Disease of thyroid gland     Diverticulosis     Down's syndrome     Fatty liver     Hyperkalemia     last assessed: 7/15/2014    Hyperlipidemia     Hypothyroidism     last assessed: 8/31/2017    Mental retardation     Osteoporosis     Psychiatric disorder      No past surgical history on file  Social History   Social History     Substance and Sexual Activity   Alcohol Use No     Social History     Substance and Sexual Activity   Drug Use No     Social History     Tobacco Use   Smoking Status Never Smoker   Smokeless Tobacco Never Used     Family History:   Family History   Problem Relation Age of Onset    No Known Problems Mother     No Known Problems Family        Meds/Allergies   all medications and allergies reviewed  No Known Allergies    Objective:      Pulse 70   Resp 17   Ht 4' 3 5" (1 308 m)   Wt 43 5 kg (96 lb)   BMI 25 45 kg/m²          Physical Exam  Constitutional:       General: She is not in acute distress  Appearance: She is well-developed  She is not ill-appearing, toxic-appearing or diaphoretic  HENT:      Head: Normocephalic  Cardiovascular:      Pulses:           Dorsalis pedis pulses are 1+ on the right side and 1+ on the left side  Posterior tibial pulses are 0 on the right side and 0 on the left side  Comments: Palpable DP pulse, nonpalpable PT pulse, CFT is less than 3 seconds, temperature gradient within normal limit, a trophic skin changes noted with skin thinning in shiny, patient report occasional claudication to bilateral calf, localized edema foot and ankle Q9  Pulmonary:      Effort: Pulmonary effort is normal    Musculoskeletal:         General: Tenderness and deformity present  Comments: Pes planus type foot pain on palpation and range of motion of the 1st MPJ, metatarsal heads bilateral foot, pain on palpation on range of motion of the ST joint, crepitus noted in midfoot joint  Severe HAV deformity bilateral with overlapping hallux bilateral hammertoe deformity   Skin:     General: Skin is dry  Capillary Refill: Capillary refill takes 2 to 3 seconds        Comments: Trophic skin changes bilateral foot and ankle, alternating hypopigmentation and hyperpigmentation patches around the foot and ankle on anterior leg, skin is shiny and thin, absent hair growth, atrophy of fat pad  Multiple callus formation plantar foot painful on palpation to plantar heel bilateral, skin lines absent, hyperkeratotic rim and central atrophy noted  Thickened dystrophic discolored toenails of bilateral hallux, 5th digit bilateral, subungual debris and painful upon palpation, all other nails show signs of dystrophy with no subungual debris, all nails have malodor  Neurological:      Mental Status: She is alert and oriented to person, place, and time  Sensory: Sensory deficit present  Motor: Atrophy present        Deep Tendon Reflexes: Reflexes abnormal       Foot Exam    Right Foot/Ankle     Neurovascular  Dorsalis pedis: 1+  Posterior tibial: 0      Left Foot/Ankle      Neurovascular  Dorsalis pedis: 1+  Posterior tibial: 0

## 2022-07-14 ENCOUNTER — OFFICE VISIT (OUTPATIENT)
Dept: PODIATRY | Facility: CLINIC | Age: 67
End: 2022-07-14
Payer: MEDICARE

## 2022-07-14 VITALS — BODY MASS INDEX: 22.21 KG/M2 | HEIGHT: 55 IN | WEIGHT: 96 LBS

## 2022-07-14 DIAGNOSIS — M20.12 VALGUS DEFORMITY OF BOTH GREAT TOES: ICD-10-CM

## 2022-07-14 DIAGNOSIS — M21.962 DEFORMITY OF BOTH FEET: ICD-10-CM

## 2022-07-14 DIAGNOSIS — I70.209 ATHEROSCLEROSIS OF ARTERIES OF EXTREMITIES (HCC): Primary | ICD-10-CM

## 2022-07-14 DIAGNOSIS — L60.0 INGROWN TOENAIL: ICD-10-CM

## 2022-07-14 DIAGNOSIS — M79.672 PAIN IN BOTH FEET: ICD-10-CM

## 2022-07-14 DIAGNOSIS — M21.961 DEFORMITY OF BOTH FEET: ICD-10-CM

## 2022-07-14 DIAGNOSIS — B35.1 ONYCHOMYCOSIS: ICD-10-CM

## 2022-07-14 DIAGNOSIS — M79.671 PAIN IN BOTH FEET: ICD-10-CM

## 2022-07-14 DIAGNOSIS — M20.11 VALGUS DEFORMITY OF BOTH GREAT TOES: ICD-10-CM

## 2022-07-14 DIAGNOSIS — L84 CALLUS: ICD-10-CM

## 2022-07-14 PROCEDURE — 11721 DEBRIDE NAIL 6 OR MORE: CPT | Performed by: PODIATRIST

## 2022-09-27 NOTE — PROGRESS NOTES
Assessment/Plan:    Patient opted out of oral and topical anti fungal medications at this time, all onychomycotic dystrophic nails debrided using sterile Nipper and electrical junior to patient tolerance, Betadine applied, patient educated on daily foot hygiene for the management of fungal infection  Discussed with aide the use of wider toe box shoes to accommodate for patient bunion deformity and pain to the medial eminence, patient also educated on the use of emollient to avoid dry skin and skin fissures for relief   Diagnoses and all orders for this visit:    Atherosclerosis of arteries of extremities (HCC)    Valgus deformity of both great toes    Deformity of both feet    Pain in both feet    Onychomycosis    Ingrown toenail    Callus          Subjective:      Patient ID: Bethel Hobson is a 74 y o  female  29-year-old female with past medical history significant of developmental neurological condition, 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, patient unable to self treat due to her conditions, patient complains of painful elongated thickened dystrophic toenails, also report bunion deformity that causing pain upon ambulation and in shoe gear  The following portions of the patient's history were reviewed and updated as appropriate: allergies, current medications, past family history, past medical history, past social history, past surgical history and problem list     Review of Systems   Constitutional: Negative  Respiratory: Negative  Cardiovascular: Negative  Musculoskeletal: Positive for arthralgias and joint swelling  Skin: Positive for color change                 Historical Information   Past Medical History:   Diagnosis Date    Dementia ([facility] [address] [facility]     Depression with anxiety     resolved: 10/12/2017    Disease of thyroid gland     Diverticulosis     Down's syndrome     Fatty liver     Hyperkalemia     last assessed: 7/15/2014    Hyperlipidemia     Hypothyroidism     last assessed: 8/31/2017    Mental retardation     Osteoporosis     Psychiatric disorder      No past surgical history on file  Social History   Social History     Substance and Sexual Activity   Alcohol Use No     Social History     Substance and Sexual Activity   Drug Use No     Social History     Tobacco Use   Smoking Status Never Smoker   Smokeless Tobacco Never Used     Family History:   Family History   Problem Relation Age of Onset    No Known Problems Mother     No Known Problems Family        Meds/Allergies   all medications and allergies reviewed  No Known Allergies    Objective:      Ht 4' 3 5" (1 308 m)   Wt 43 5 kg (96 lb)   BMI 25 45 kg/m²          Physical Exam  Constitutional:       General: She is not in acute distress  Appearance: She is well-developed  She is not ill-appearing, toxic-appearing or diaphoretic  HENT:      Head: Normocephalic  Cardiovascular:      Pulses:           Dorsalis pedis pulses are 1+ on the right side and 1+ on the left side  Posterior tibial pulses are 0 on the right side and 0 on the left side  Comments: Palpable DP pulse, nonpalpable PT pulse, CFT is less than 3 seconds, temperature gradient within normal limit, a trophic skin changes noted with skin thinning in shiny, patient report occasional claudication to bilateral calf, localized edema foot and ankle Q9  Pulmonary:      Effort: Pulmonary effort is normal    Musculoskeletal:         General: Tenderness and deformity present  Comments: Pes planus type foot pain on palpation and range of motion of the 1st MPJ, metatarsal heads bilateral foot, pain on palpation on range of motion of the ST joint, crepitus noted in midfoot joint  Severe HAV deformity bilateral with overlapping hallux bilateral hammertoe deformity   Skin:     General: Skin is dry  Capillary Refill: Capillary refill takes 2 to 3 seconds        Comments: Trophic skin changes bilateral foot and ankle, alternating hypopigmentation and hyperpigmentation patches around the foot and ankle on anterior leg, skin is shiny and thin, absent hair growth, atrophy of fat pad  Multiple callus formation plantar foot painful on palpation to plantar heel bilateral, skin lines absent, hyperkeratotic rim and central atrophy noted  Thickened dystrophic discolored toenails of bilateral hallux, 5th digit bilateral, subungual debris and painful upon palpation, all other nails show signs of dystrophy with no subungual debris, all nails have malodor  Neurological:      Mental Status: She is alert and oriented to person, place, and time  Sensory: Sensory deficit present  Motor: Atrophy present        Deep Tendon Reflexes: Reflexes abnormal       Foot Exam    Right Foot/Ankle     Neurovascular  Dorsalis pedis: 1+  Posterior tibial: 0      Left Foot/Ankle      Neurovascular  Dorsalis pedis: 1+  Posterior tibial: 0

## 2022-09-29 ENCOUNTER — OFFICE VISIT (OUTPATIENT)
Dept: PODIATRY | Facility: CLINIC | Age: 67
End: 2022-09-29

## 2022-09-29 VITALS — BODY MASS INDEX: 22.21 KG/M2 | WEIGHT: 96 LBS | RESPIRATION RATE: 17 BRPM | HEIGHT: 55 IN

## 2022-09-29 DIAGNOSIS — M20.11 VALGUS DEFORMITY OF BOTH GREAT TOES: ICD-10-CM

## 2022-09-29 DIAGNOSIS — M20.12 VALGUS DEFORMITY OF BOTH GREAT TOES: ICD-10-CM

## 2022-09-29 DIAGNOSIS — M79.671 PAIN IN BOTH FEET: ICD-10-CM

## 2022-09-29 DIAGNOSIS — B35.1 ONYCHOMYCOSIS: ICD-10-CM

## 2022-09-29 DIAGNOSIS — L84 CALLUS: ICD-10-CM

## 2022-09-29 DIAGNOSIS — M79.672 PAIN IN BOTH FEET: ICD-10-CM

## 2022-09-29 DIAGNOSIS — I70.209 ATHEROSCLEROSIS OF ARTERIES OF EXTREMITIES (HCC): Primary | ICD-10-CM

## 2022-09-29 DIAGNOSIS — L60.0 INGROWN TOENAIL: ICD-10-CM

## 2022-09-29 DIAGNOSIS — M21.962 DEFORMITY OF BOTH FEET: ICD-10-CM

## 2022-09-29 DIAGNOSIS — M21.961 DEFORMITY OF BOTH FEET: ICD-10-CM

## 2022-12-06 NOTE — PROGRESS NOTES
Assessment/Plan:    Patient opted out of oral and topical anti fungal medications at this time, all onychomycotic dystrophic nails debrided using sterile Nipper and electrical junior to patient tolerance, Betadine applied, patient educated on daily foot hygiene for the management of fungal infection  Discussed with aide the use of wider toe box shoes to accommodate for patient bunion deformity and pain to the medial eminence, patient also educated on the use of emollient to avoid dry skin and skin fissures for relief   Diagnoses and all orders for this visit:    Atherosclerosis of arteries of extremities (HCC)    Valgus deformity of both great toes    Deformity of both feet    Pain in both feet    Onychomycosis    Ingrown toenail    Callus          Subjective:      Patient ID: Noah Garcia is a 74 y o  female  19-year-old female with past medical history significant of developmental neurological condition, 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, patient unable to self treat due to her conditions, patient complains of painful elongated thickened dystrophic toenails, also report bunion deformity that causing pain upon ambulation and in shoe gear  The following portions of the patient's history were reviewed and updated as appropriate: allergies, current medications, past family history, past medical history, past social history, past surgical history and problem list     Review of Systems   Constitutional: Negative  Respiratory: Negative  Cardiovascular: Negative  Musculoskeletal: Positive for arthralgias and joint swelling  Skin: Positive for color change                 Historical Information   Past Medical History:   Diagnosis Date   • Dementia ([facility] [address] )    • Depression with anxiety     resolved: 10/12/2017   • Disease of thyroid gland    • Diverticulosis    • Down's syndrome    • Fatty liver    • Hyperkalemia     last assessed: 7/15/2014   • Hyperlipidemia    • Hypothyroidism     last assessed: 8/31/2017   • Mental retardation    • Osteoporosis    • Psychiatric disorder      No past surgical history on file  Social History   Social History     Substance and Sexual Activity   Alcohol Use No     Social History     Substance and Sexual Activity   Drug Use No     Social History     Tobacco Use   Smoking Status Never   Smokeless Tobacco Never     Family History:   Family History   Problem Relation Age of Onset   • No Known Problems Mother    • No Known Problems Family        Meds/Allergies   all medications and allergies reviewed  No Known Allergies    Objective:      Resp 17   Ht 4' 3 5" (1 308 m)   Wt 43 5 kg (96 lb)   BMI 25 45 kg/m²          Physical Exam  Constitutional:       General: She is not in acute distress  Appearance: She is well-developed  She is not ill-appearing, toxic-appearing or diaphoretic  HENT:      Head: Normocephalic  Cardiovascular:      Pulses:           Dorsalis pedis pulses are 1+ on the right side and 1+ on the left side  Posterior tibial pulses are 0 on the right side and 0 on the left side  Comments: Palpable DP pulse, nonpalpable PT pulse, CFT is less than 3 seconds, temperature gradient within normal limit, a trophic skin changes noted with skin thinning in shiny, patient report occasional claudication to bilateral calf, localized edema foot and ankle Q9  Pulmonary:      Effort: Pulmonary effort is normal    Musculoskeletal:         General: Tenderness and deformity present  Comments: Pes planus type foot pain on palpation and range of motion of the 1st MPJ, metatarsal heads bilateral foot, pain on palpation on range of motion of the ST joint, crepitus noted in midfoot joint  Severe HAV deformity bilateral with overlapping hallux bilateral hammertoe deformity   Skin:     General: Skin is dry  Capillary Refill: Capillary refill takes 2 to 3 seconds        Comments: Trophic skin changes bilateral foot and ankle, alternating hypopigmentation and hyperpigmentation patches around the foot and ankle on anterior leg, skin is shiny and thin, absent hair growth, atrophy of fat pad  Multiple callus formation plantar foot painful on palpation to plantar heel bilateral, skin lines absent, hyperkeratotic rim and central atrophy noted  Thickened dystrophic discolored toenails of bilateral hallux, 5th digit bilateral, subungual debris and painful upon palpation, all other nails show signs of dystrophy with no subungual debris, all nails have malodor  Neurological:      Mental Status: She is alert and oriented to person, place, and time  Sensory: Sensory deficit present  Motor: Atrophy present        Deep Tendon Reflexes: Reflexes abnormal       Foot Exam    Right Foot/Ankle     Neurovascular  Dorsalis pedis: 1+  Posterior tibial: 0      Left Foot/Ankle      Neurovascular  Dorsalis pedis: 1+  Posterior tibial: 0

## 2022-12-22 ENCOUNTER — OFFICE VISIT (OUTPATIENT)
Dept: PODIATRY | Facility: CLINIC | Age: 67
End: 2022-12-22

## 2022-12-22 VITALS — BODY MASS INDEX: 22.21 KG/M2 | HEIGHT: 55 IN | RESPIRATION RATE: 17 BRPM | WEIGHT: 96 LBS

## 2022-12-22 DIAGNOSIS — M20.11 VALGUS DEFORMITY OF BOTH GREAT TOES: ICD-10-CM

## 2022-12-22 DIAGNOSIS — L60.0 INGROWN TOENAIL: ICD-10-CM

## 2022-12-22 DIAGNOSIS — I70.209 ATHEROSCLEROSIS OF ARTERIES OF EXTREMITIES (HCC): Primary | ICD-10-CM

## 2022-12-22 DIAGNOSIS — M21.962 DEFORMITY OF BOTH FEET: ICD-10-CM

## 2022-12-22 DIAGNOSIS — M79.672 PAIN IN BOTH FEET: ICD-10-CM

## 2022-12-22 DIAGNOSIS — B35.1 ONYCHOMYCOSIS: ICD-10-CM

## 2022-12-22 DIAGNOSIS — M21.961 DEFORMITY OF BOTH FEET: ICD-10-CM

## 2022-12-22 DIAGNOSIS — M79.671 PAIN IN BOTH FEET: ICD-10-CM

## 2022-12-22 DIAGNOSIS — M20.12 VALGUS DEFORMITY OF BOTH GREAT TOES: ICD-10-CM

## 2022-12-22 DIAGNOSIS — L84 CALLUS: ICD-10-CM

## 2022-12-29 NOTE — PROGRESS NOTES
Assessment/Plan:    Patient opted out of oral and topical anti fungal medications at this time, all onychomycotic dystrophic nails debrided using sterile Nipper and electrical junior to patient tolerance, Betadine applied, patient educated on daily foot hygiene for the management of fungal infection  Discussed with aide the use of wider toe box shoes to accommodate for patient bunion deformity and pain to the medial eminence, patient also educated on the use of emollient to avoid dry skin and skin fissures for relief   Diagnoses and all orders for this visit:    Atherosclerosis of arteries of extremities (HCC)    Valgus deformity of both great toes    Deformity of both feet    Pain in both feet    Onychomycosis    Ingrown toenail    Callus          Subjective:      Patient ID: Mai Jo is a 74 y o  female  72-year-old female with past medical history significant of developmental neurological condition, 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, patient unable to self treat due to her conditions, patient complains of painful elongated thickened dystrophic toenails, also report bunion deformity that causing pain upon ambulation and in shoe gear  The following portions of the patient's history were reviewed and updated as appropriate: allergies, current medications, past family history, past medical history, past social history, past surgical history and problem list     Review of Systems   Constitutional: Negative  Respiratory: Negative  Cardiovascular: Negative  Musculoskeletal: Positive for arthralgias and joint swelling  Skin: Positive for color change                 Historical Information   Past Medical History:   Diagnosis Date   • Dementia ([facility] [address] )    • Depression with anxiety     resolved: 10/12/2017   • Disease of thyroid gland    • Diverticulosis    • Down's syndrome    • Fatty liver    • Hyperkalemia     last assessed: 7/15/2014   • Hyperlipidemia    • Hypothyroidism     last assessed: 8/31/2017   • Mental retardation    • Osteoporosis    • Psychiatric disorder      No past surgical history on file  Social History   Social History     Substance and Sexual Activity   Alcohol Use No     Social History     Substance and Sexual Activity   Drug Use No     Social History     Tobacco Use   Smoking Status Never   Smokeless Tobacco Never     Family History:   Family History   Problem Relation Age of Onset   • No Known Problems Mother    • No Known Problems Family        Meds/Allergies   all medications and allergies reviewed  No Known Allergies    Objective:      Resp 17   Ht 4' 3 5" (1 308 m)   Wt 43 5 kg (96 lb)   BMI 25 45 kg/m²          Physical Exam  Constitutional:       General: She is not in acute distress  Appearance: She is well-developed  She is not ill-appearing, toxic-appearing or diaphoretic  HENT:      Head: Normocephalic  Cardiovascular:      Pulses:           Dorsalis pedis pulses are 1+ on the right side and 1+ on the left side  Posterior tibial pulses are 0 on the right side and 0 on the left side  Comments: Palpable DP pulse, nonpalpable PT pulse, CFT is less than 3 seconds, temperature gradient within normal limit, a trophic skin changes noted with skin thinning in shiny, patient report occasional claudication to bilateral calf, localized edema foot and ankle Q9  Pulmonary:      Effort: Pulmonary effort is normal    Musculoskeletal:         General: Tenderness and deformity present  Comments: Pes planus type foot pain on palpation and range of motion of the 1st MPJ, metatarsal heads bilateral foot, pain on palpation on range of motion of the ST joint, crepitus noted in midfoot joint  Severe HAV deformity bilateral with overlapping hallux bilateral hammertoe deformity   Skin:     General: Skin is dry  Capillary Refill: Capillary refill takes 2 to 3 seconds        Comments: Trophic skin changes bilateral foot and ankle, alternating hypopigmentation and hyperpigmentation patches around the foot and ankle on anterior leg, skin is shiny and thin, absent hair growth, atrophy of fat pad  Multiple callus formation plantar foot painful on palpation to plantar heel bilateral, skin lines absent, hyperkeratotic rim and central atrophy noted  Thickened dystrophic discolored toenails of bilateral hallux, 5th digit bilateral, subungual debris and painful upon palpation, all other nails show signs of dystrophy with no subungual debris, all nails have malodor  Neurological:      Mental Status: She is alert and oriented to person, place, and time  Sensory: Sensory deficit present  Motor: Atrophy present        Deep Tendon Reflexes: Reflexes abnormal       Foot Exam    Right Foot/Ankle     Neurovascular  Dorsalis pedis: 1+  Posterior tibial: 0      Left Foot/Ankle      Neurovascular  Dorsalis pedis: 1+  Posterior tibial: 0

## 2023-03-09 ENCOUNTER — OFFICE VISIT (OUTPATIENT)
Dept: PODIATRY | Facility: CLINIC | Age: 68
End: 2023-03-09

## 2023-03-09 VITALS — WEIGHT: 96 LBS | RESPIRATION RATE: 17 BRPM | BODY MASS INDEX: 22.21 KG/M2 | HEIGHT: 55 IN

## 2023-03-09 DIAGNOSIS — L84 CALLUS: ICD-10-CM

## 2023-03-09 DIAGNOSIS — M20.12 VALGUS DEFORMITY OF BOTH GREAT TOES: ICD-10-CM

## 2023-03-09 DIAGNOSIS — M21.961 DEFORMITY OF BOTH FEET: ICD-10-CM

## 2023-03-09 DIAGNOSIS — L60.0 INGROWN TOENAIL: ICD-10-CM

## 2023-03-09 DIAGNOSIS — B35.1 ONYCHOMYCOSIS: ICD-10-CM

## 2023-03-09 DIAGNOSIS — M20.11 VALGUS DEFORMITY OF BOTH GREAT TOES: ICD-10-CM

## 2023-03-09 DIAGNOSIS — M79.671 PAIN IN BOTH FEET: ICD-10-CM

## 2023-03-09 DIAGNOSIS — M79.672 PAIN IN BOTH FEET: ICD-10-CM

## 2023-03-09 DIAGNOSIS — I70.209 ATHEROSCLEROSIS OF ARTERIES OF EXTREMITIES (HCC): Primary | ICD-10-CM

## 2023-03-09 DIAGNOSIS — M21.962 DEFORMITY OF BOTH FEET: ICD-10-CM

## 2023-03-10 NOTE — PROGRESS NOTES
Assessment/Plan:    Patient opted out of oral and topical anti fungal medications at this time, all onychomycotic dystrophic nails debrided using sterile Nipper and electrical junior to patient tolerance, Betadine applied, patient educated on daily foot hygiene for the management of fungal infection  Discussed with aide the use of wider toe box shoes to accommodate for patient bunion deformity and pain to the medial eminence, patient also educated on the use of emollient to avoid dry skin and skin fissures for relief   Diagnoses and all orders for this visit:    Atherosclerosis of arteries of extremities (HCC)    Valgus deformity of both great toes    Deformity of both feet    Pain in both feet    Onychomycosis    Ingrown toenail    Callus          Subjective:      Patient ID: Sabrina Rubio is a 74 y o  female  24-year-old female with past medical history significant of developmental neurological condition, 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, patient unable to self treat due to her conditions, patient complains of painful elongated thickened dystrophic toenails, also report bunion deformity that causing pain upon ambulation and in shoe gear  The following portions of the patient's history were reviewed and updated as appropriate: allergies, current medications, past family history, past medical history, past social history, past surgical history and problem list     Review of Systems   Constitutional: Negative  Respiratory: Negative  Cardiovascular: Negative  Musculoskeletal: Positive for arthralgias and joint swelling  Skin: Positive for color change                 Historical Information   Past Medical History:   Diagnosis Date   • [facility]    • Depression with anxiety     resolved: 10/12/2017   • Disease of thyroid gland    • Diverticulosis    • Down's syndrome    • Fatty liver    • Hyperkalemia     last assessed: 7/15/2014   • Hyperlipidemia    • Hypothyroidism     last assessed: 8/31/2017   • Mental retardation    • Osteoporosis    • Psychiatric disorder      No past surgical history on file  Social History   Social History     Substance and Sexual Activity   Alcohol Use No     Social History     Substance and Sexual Activity   Drug Use No     Social History     Tobacco Use   Smoking Status Never   Smokeless Tobacco Never     Family History:   Family History   Problem Relation Age of Onset   • No Known Problems Mother    • No Known Problems Family        Meds/Allergies   all medications and allergies reviewed  No Known Allergies    Objective:      Resp 17   Ht 4' 3 5" (1 308 m)   Wt 43 5 kg (96 lb)   BMI 25 45 kg/m²          Physical Exam  Constitutional:       General: She is not in acute distress  Appearance: She is well-developed  She is not ill-appearing, toxic-appearing or diaphoretic  HENT:      Head: Normocephalic  Cardiovascular:      Pulses:           Dorsalis pedis pulses are 1+ on the right side and 1+ on the left side  Posterior tibial pulses are 0 on the right side and 0 on the left side  Comments: Palpable DP pulse, nonpalpable PT pulse, CFT is less than 3 seconds, temperature gradient within normal limit, a trophic skin changes noted with skin thinning in shiny, patient report occasional claudication to bilateral calf, localized edema foot and ankle Q9  Pulmonary:      Effort: Pulmonary effort is normal    Musculoskeletal:         General: Tenderness and deformity present  Comments: Pes planus type foot pain on palpation and range of motion of the 1st MPJ, metatarsal heads bilateral foot, pain on palpation on range of motion of the ST joint, crepitus noted in midfoot joint  Severe HAV deformity bilateral with overlapping hallux bilateral hammertoe deformity   Skin:     General: Skin is dry  Capillary Refill: Capillary refill takes 2 to 3 seconds        Comments: Trophic skin changes bilateral foot and ankle, alternating hypopigmentation and hyperpigmentation patches around the foot and ankle on anterior leg, skin is shiny and thin, absent hair growth, atrophy of fat pad  Multiple callus formation plantar foot painful on palpation to plantar heel bilateral, skin lines absent, hyperkeratotic rim and central atrophy noted  Thickened dystrophic discolored toenails of bilateral hallux, 5th digit bilateral, subungual debris and painful upon palpation, all other nails show signs of dystrophy with no subungual debris, all nails have malodor  Neurological:      Mental Status: She is alert and oriented to person, place, and time  Sensory: Sensory deficit present  Motor: Atrophy present        Deep Tendon Reflexes: Reflexes abnormal       Foot Exam    Right Foot/Ankle     Neurovascular  Dorsalis pedis: 1+  Posterior tibial: 0      Left Foot/Ankle      Neurovascular  Dorsalis pedis: 1+  Posterior tibial: 0

## 2023-05-10 ENCOUNTER — OFFICE VISIT (OUTPATIENT)
Dept: PODIATRY | Facility: CLINIC | Age: 68
End: 2023-05-10

## 2023-05-10 VITALS — RESPIRATION RATE: 16 BRPM | BODY MASS INDEX: 22.21 KG/M2 | WEIGHT: 96 LBS | HEIGHT: 55 IN

## 2023-05-10 DIAGNOSIS — M20.12 HALLUX VALGUS OF LEFT FOOT: Primary | ICD-10-CM

## 2023-05-10 DIAGNOSIS — M21.619 BUNION: ICD-10-CM

## 2023-05-10 DIAGNOSIS — I70.209 ATHEROSCLEROSIS OF ARTERIES OF EXTREMITIES (HCC): ICD-10-CM

## 2023-05-10 DIAGNOSIS — B35.1 ONYCHOMYCOSIS: ICD-10-CM

## 2023-05-10 DIAGNOSIS — M21.962 ACQUIRED DEFORMITY OF LEFT FOOT: ICD-10-CM

## 2023-05-10 DIAGNOSIS — M79.671 PAIN IN BOTH FEET: ICD-10-CM

## 2023-05-10 DIAGNOSIS — M79.672 PAIN IN BOTH FEET: ICD-10-CM

## 2023-05-10 NOTE — PROGRESS NOTES
Expand AllCollapse All    Assessment/Plan:    Chart reviewed  Patient examined with staff at chair side      Patient opted out of oral and topical anti fungal medications at this time, all onychomycotic dystrophic nails debrided using sterile Nipper and electrical junior to patient tolerance, Betadine applied, patient educated on daily foot hygiene for the management of fungal infection  Discussed with aide the use of wider toe box shoes to accommodate for patient bunion deformity and pain to the medial eminence, patient also educated on the use of emollient to avoid dry skin and skin fissures for relief  Staff is concerned about bunion of left foot  The bunion is not inflamed at this time  No evidence of ulceration  It is podiatry's recommendation that patient just use proper site shoes prior to entertaining surgical intervention  Diagnoses and all orders for this visit:     Atherosclerosis of arteries of extremities (HCC)     Hallux valgus left      Deformity of both feet     Pain in both feet     Onychomycosis     Ingrown toenail     Callus            Subjective:       Patient ID: Matthias Covington is a 74 y o  female      62-year-old female with past medical history significant of developmental neurological condition, 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, patient unable to self treat due to her conditions, patient complains of painful elongated thickened dystrophic toenails, also report bunion deformity that causing pain upon ambulation and in shoe gear         The following portions of the patient's history were reviewed and updated as appropriate: allergies, current medications, past family history, past medical history, past social history, past surgical history and problem list      Review of Systems   Constitutional: Negative  Respiratory: Negative  Cardiovascular: Negative  Musculoskeletal: Positive for arthralgias and joint swelling     Skin: Positive for color change                    Historical Information         Medical History        Past Medical History:   Diagnosis Date   • Dementia ([address] )     • Depression with anxiety       resolved: 10/12/2017   • Disease of thyroid gland     • Diverticulosis     • Down's syndrome     • Fatty liver     • Hyperkalemia       last assessed: 7/15/2014   • Hyperlipidemia     • Hypothyroidism       last assessed: 8/31/2017   • Mental retardation     • Osteoporosis     • Psychiatric disorder           Surgical History   No past surgical history on file  Social History         Social History          Substance and Sexual Activity   Alcohol Use No      Social History          Substance and Sexual Activity   Drug Use No      Social History          Tobacco Use   Smoking Status Never   Smokeless Tobacco Never      Family History:   Family History         Family History   Problem Relation Age of Onset   • No Known Problems Mother     • No Known Problems Family              Meds/Allergies   all medications and allergies reviewed  No Known Allergies     Objective:            Physical Exam  Constitutional:       General: She is not in acute distress  Appearance: She is well-developed  She is not ill-appearing, toxic-appearing or diaphoretic  HENT:      Head: Normocephalic  Cardiovascular:      Pulses:           Dorsalis pedis pulses are 1+ on the right side and 1+ on the left side  Posterior tibial pulses are 0 on the right side and 0 on the left side  Comments: Palpable DP pulse, nonpalpable PT pulse, CFT is less than 3 seconds, temperature gradient within normal limit, a trophic skin changes noted with skin thinning in shiny, patient report occasional claudication to bilateral calf, localized edema foot and ankle Q9  Pulmonary:      Effort: Pulmonary effort is normal    Musculoskeletal:         General: Tenderness and deformity present        Comments: Pes planus type foot pain on palpation and range of motion of the 1st MPJ, metatarsal heads bilateral foot, pain on palpation on range of motion of the ST joint, crepitus noted in midfoot joint  Severe HAV deformity bilateral with overlapping hallux bilateral hammertoe deformity   Skin:     General: Skin is dry  Capillary Refill: Capillary refill takes 2 to 3 seconds  Comments: Trophic skin changes bilateral foot and ankle, alternating hypopigmentation and hyperpigmentation patches around the foot and ankle on anterior leg, skin is shiny and thin, absent hair growth, atrophy of fat pad  Multiple callus formation plantar foot painful on palpation to plantar heel bilateral, skin lines absent, hyperkeratotic rim and central atrophy noted  Thickened dystrophic discolored toenails of bilateral hallux, 5th digit bilateral, subungual debris and painful upon palpation, all other nails show signs of dystrophy with no subungual debris, all nails have malodor  Neurological:      Mental Status: She is alert and oriented to person, place, and time  Sensory: Sensory deficit present  Motor: Atrophy present        Deep Tendon Reflexes: Reflexes abnormal      Foot Exam     Right Foot/Ankle      Neurovascular  Dorsalis pedis: 1+  Posterior tibial: 0        Left Foot/Ankle       Neurovascular  Dorsalis pedis: 1+  Posterior tibial: 0

## 2023-06-16 ENCOUNTER — OFFICE VISIT (OUTPATIENT)
Dept: PODIATRY | Facility: CLINIC | Age: 68
End: 2023-06-16
Payer: MEDICARE

## 2023-06-16 VITALS — WEIGHT: 96 LBS | HEIGHT: 55 IN | BODY MASS INDEX: 22.21 KG/M2 | RESPIRATION RATE: 16 BRPM

## 2023-06-16 DIAGNOSIS — M21.961 DEFORMITY OF BOTH FEET: ICD-10-CM

## 2023-06-16 DIAGNOSIS — L60.0 INGROWN TOENAIL: ICD-10-CM

## 2023-06-16 DIAGNOSIS — M79.672 PAIN IN BOTH FEET: Primary | ICD-10-CM

## 2023-06-16 DIAGNOSIS — M79.671 PAIN IN BOTH FEET: Primary | ICD-10-CM

## 2023-06-16 DIAGNOSIS — B35.1 ONYCHOMYCOSIS: ICD-10-CM

## 2023-06-16 DIAGNOSIS — M21.962 DEFORMITY OF BOTH FEET: ICD-10-CM

## 2023-06-16 PROCEDURE — 99212 OFFICE O/P EST SF 10 MIN: CPT | Performed by: PODIATRIST

## 2023-06-16 NOTE — PROGRESS NOTES
Assessment/Plan:     Chart reviewed  Patient examined with staff at chair side      Patient opted out of oral and topical anti fungal medications at this time, all onychomycotic dystrophic nails debrided using sterile Nipper and electrical junior to patient tolerance, Betadine applied, patient educated on daily foot hygiene for the management of fungal infection  Discussed with aide the use of wider toe box shoes to accommodate for patient bunion deformity and pain to the medial eminence, patient also educated on the use of emollient to avoid dry skin and skin fissures for relief      Staff is concerned about bunion of left foot  The bunion is not inflamed at this time  No evidence of ulceration  It is podiatry's recommendation that patient just use proper site shoes prior to entertaining surgical intervention          Diagnoses and all orders for this visit:     Atherosclerosis of arteries of extremities (HCC)     Hallux valgus left      Deformity of both feet     Pain in both feet     Onychomycosis     Ingrown toenail     Callus            Subjective:       Patient ID: Nita Carlton is a 79 y o  female      80-year-old female with past medical history significant of developmental neurological condition, 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, patient unable to self treat due to her conditions, patient complains of painful elongated thickened dystrophic toenails, also report bunion deformity that causing pain upon ambulation and in shoe gear         The following portions of the patient's history were reviewed and updated as appropriate: allergies, current medications, past family history, past medical history, past social history, past surgical history and problem list      Review of Systems   Constitutional: Negative     Respiratory: Negative     Cardiovascular: Negative     Musculoskeletal: Positive for arthralgias and joint swelling     Skin: Positive for color change                    Historical Information          Medical History           Past Medical History:   Diagnosis Date   • Dementia ([facility] [address] )     • Depression with anxiety       resolved: 10/12/2017   • Disease of thyroid gland     • Diverticulosis     • Down's syndrome     • Fatty liver     • Hyperkalemia       last assessed: 7/15/2014   • Hyperlipidemia     • Hypothyroidism       last assessed: 8/31/2017   • Mental retardation     • Osteoporosis     • Psychiatric disorder           Surgical History   No past surgical history on file       Social History          Social History            Substance and Sexual Activity   Alcohol Use No      Social History            Substance and Sexual Activity   Drug Use No      Social History            Tobacco Use   Smoking Status Never   Smokeless Tobacco Never      Family History:   Family History             Family History   Problem Relation Age of Onset   • No Known Problems Mother     • No Known Problems Family              Meds/Allergies   all medications and allergies reviewed  No Known Allergies     Objective:            Physical Exam  Constitutional:       General: She is not in acute distress      Appearance: She is well-developed  She is not ill-appearing, toxic-appearing or diaphoretic  HENT:      Head: Normocephalic     Cardiovascular:      Pulses:           Dorsalis pedis pulses are 1+ on the right side and 1+ on the left side         Posterior tibial pulses are 0 on the right side and 0 on the left side       Comments: Palpable DP pulse, nonpalpable PT pulse, CFT is less than 3 seconds, temperature gradient within normal limit, a trophic skin changes noted with skin thinning in shiny, patient report occasional claudication to bilateral calf, localized edema foot and ankle Q9  Pulmonary:      Effort: Pulmonary effort is normal    Musculoskeletal:         General: Tenderness and deformity present       Comments: Pes planus type foot pain on palpation and range of motion of the 1st MPJ, metatarsal heads bilateral foot, pain on palpation on range of motion of the ST joint, crepitus noted in midfoot joint  Severe HAV deformity bilateral with overlapping hallux bilateral hammertoe deformity   Skin:     General: Skin is dry       Capillary Refill: Capillary refill takes 2 to 3 seconds       Comments: Trophic skin changes bilateral foot and ankle, alternating hypopigmentation and hyperpigmentation patches around the foot and ankle on anterior leg, skin is shiny and thin, absent hair growth, atrophy of fat pad  Multiple callus formation plantar foot painful on palpation to plantar heel bilateral, skin lines absent, hyperkeratotic rim and central atrophy noted      Thickened dystrophic discolored toenails of bilateral hallux, 5th digit bilateral, subungual debris and painful upon palpation, all other nails show signs of dystrophy with no subungual debris, all nails have malodor    Neurological:      Mental Status: She is alert and oriented to person, place, and time       Sensory: Sensory deficit present       Motor: Atrophy present    Rae Gina Tendon Reflexes: Reflexes abnormal      Foot Exam     Right Foot/Ankle      Neurovascular  Dorsalis pedis: 1+  Posterior tibial: 0        Left Foot/Ankle       Neurovascular  Dorsalis pedis: 1+  Posterior tibial: 0

## 2023-09-15 ENCOUNTER — OFFICE VISIT (OUTPATIENT)
Dept: PODIATRY | Facility: CLINIC | Age: 68
End: 2023-09-15
Payer: MEDICARE

## 2023-09-15 VITALS — BODY MASS INDEX: 22.21 KG/M2 | HEIGHT: 55 IN | RESPIRATION RATE: 16 BRPM | WEIGHT: 96 LBS

## 2023-09-15 DIAGNOSIS — M79.672 PAIN IN BOTH FEET: Primary | ICD-10-CM

## 2023-09-15 DIAGNOSIS — B35.1 ONYCHOMYCOSIS: ICD-10-CM

## 2023-09-15 DIAGNOSIS — L60.0 INGROWN TOENAIL: ICD-10-CM

## 2023-09-15 DIAGNOSIS — M21.961 DEFORMITY OF BOTH FEET: ICD-10-CM

## 2023-09-15 DIAGNOSIS — M79.671 PAIN IN BOTH FEET: Primary | ICD-10-CM

## 2023-09-15 DIAGNOSIS — M21.962 DEFORMITY OF BOTH FEET: ICD-10-CM

## 2023-09-15 DIAGNOSIS — M20.12 HALLUX VALGUS OF LEFT FOOT: ICD-10-CM

## 2023-09-15 PROCEDURE — 99212 OFFICE O/P EST SF 10 MIN: CPT | Performed by: PODIATRIST

## 2023-09-15 NOTE — PROGRESS NOTES
Assessment/Plan:     Chart reviewed.  Patient examined with staff at chair side.     Patient opted out of oral and topical anti fungal medications at this time, all onychomycotic dystrophic nails debrided using sterile Nipper and electrical junior to patient tolerance, Betadine applied, patient educated on daily foot hygiene for the management of fungal infection. Discussed with aide the use of wider toe box shoes to accommodate for patient bunion deformity and pain to the medial eminence, patient also educated on the use of emollient to avoid dry skin and skin fissures for relief.     Staff is concerned about bunion of left foot.  The bunion is not inflamed at this time.  No evidence of ulceration.  It is podiatry's recommendation that patient just use proper site shoes prior to entertaining surgical intervention.         Diagnoses and all orders for this visit:     Atherosclerosis of arteries of extremities (HCC)     Hallux valgus left.     Deformity of both feet     Pain in both feet     Onychomycosis     Ingrown toenail     Callus            Subjective:       Patient ID: Nita Carlton is a 79 y.o. female.     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        The following portions of the patient's history were reviewed and updated as appropriate: allergies, current medications, past family history, past medical history, past social history, past surgical history and problem list.     Review of Systems   Constitutional: Negative.    Respiratory: Negative.    Cardiovascular: Negative.    Musculoskeletal: Positive for arthralgias and joint swelling.    Skin: Positive for color change.                   Historical Information          Medical History           Past Medical History:   Diagnosis Date   • Dementia ([address])     • Depression with anxiety       resolved: 10/12/2017   • Disease of thyroid gland     • Diverticulosis     • Down's syndrome     • Fatty liver     • Hyperkalemia       last assessed: 7/15/2014   • Hyperlipidemia     • Hypothyroidism       last assessed: 8/31/2017   • Mental retardation     • Osteoporosis     • Psychiatric disorder           Surgical History   No past surgical history on file.      Social History          Social History            Substance and Sexual Activity   Alcohol Use No      Social History            Substance and Sexual Activity   Drug Use No      Social History            Tobacco Use   Smoking Status Never   Smokeless Tobacco Never      Family History:   Family History             Family History   Problem Relation Age of Onset   • No Known Problems Mother     • No Known Problems Family              Meds/Allergies   all medications and allergies reviewed  No Known Allergies     Objective:            Physical Exam  Constitutional:       General: She is not in acute distress.     Appearance: She is well-developed. She is not ill-appearing, toxic-appearing or diaphoretic. HENT:      Head: Normocephalic.    Cardiovascular:      Pulses:           Dorsalis pedis pulses are 1+ on the right side and 1+ on the left side.        Posterior tibial pulses are 0 on the right side and 0 on the left side.      Comments: Palpable DP pulse, nonpalpable PT pulse, CFT is less than 3 seconds, temperature gradient within normal limit, a trophic skin changes noted with skin thinning in shiny, patient report occasional claudication to bilateral calf, localized edema foot and ankle Q9  Pulmonary:      Effort: Pulmonary effort is normal.   Musculoskeletal:         General: Tenderness and deformity present.      Comments: Pes planus type foot pain on palpation and range of motion of the 1st MPJ, metatarsal heads bilateral foot, pain on palpation on range of motion of the ST joint, crepitus noted in midfoot joint. Severe HAV deformity bilateral with overlapping hallux bilateral hammertoe deformity   Skin:     General: Skin is dry.      Capillary Refill: Capillary refill takes 2 to 3 seconds.      Comments: Trophic skin changes bilateral foot and ankle, alternating hypopigmentation and hyperpigmentation patches around the foot and ankle on anterior leg, skin is shiny and thin, absent hair growth, atrophy of fat pad. Multiple callus formation plantar foot painful on palpation to plantar heel bilateral, skin lines absent, hyperkeratotic rim and central atrophy noted.     Thickened dystrophic discolored toenails of bilateral hallux, 5th digit bilateral, subungual debris and painful upon palpation, all other nails show signs of dystrophy with no subungual debris, all nails have malodor.   Neurological:      Mental Status: She is alert and oriented to person, place, and time.      Sensory: Sensory deficit present.      Motor: Atrophy present.   Garrie Power Tendon Reflexes: Reflexes abnormal.     Foot Exam     Right Foot/Ankle      Neurovascular  Dorsalis pedis: 1+  Posterior tibial: 0        Left Foot/Ankle       Neurovascular  Dorsalis pedis: 1+  Posterior tibial: 0

## 2023-12-07 ENCOUNTER — OFFICE VISIT (OUTPATIENT)
Age: 68
End: 2023-12-07
Payer: MEDICARE

## 2023-12-07 VITALS — WEIGHT: 96 LBS | BODY MASS INDEX: 22.21 KG/M2 | RESPIRATION RATE: 17 BRPM | HEIGHT: 55 IN

## 2023-12-07 DIAGNOSIS — M79.672 PAIN IN BOTH FEET: Primary | ICD-10-CM

## 2023-12-07 DIAGNOSIS — M20.12 HALLUX VALGUS OF LEFT FOOT: ICD-10-CM

## 2023-12-07 DIAGNOSIS — L60.0 INGROWN TOENAIL: ICD-10-CM

## 2023-12-07 DIAGNOSIS — M79.671 PAIN IN BOTH FEET: Primary | ICD-10-CM

## 2023-12-07 DIAGNOSIS — M21.619 BUNION: ICD-10-CM

## 2023-12-07 DIAGNOSIS — M21.962 DEFORMITY OF BOTH FEET: ICD-10-CM

## 2023-12-07 DIAGNOSIS — B35.1 ONYCHOMYCOSIS: ICD-10-CM

## 2023-12-07 DIAGNOSIS — M21.961 DEFORMITY OF BOTH FEET: ICD-10-CM

## 2023-12-07 PROCEDURE — 99212 OFFICE O/P EST SF 10 MIN: CPT | Performed by: PODIATRIST

## 2023-12-07 NOTE — PROGRESS NOTES
Assessment/Plan:     Chart reviewed. Patient examined with staff at chair side. Patient opted out of oral and topical anti fungal medications at this time, all onychomycotic dystrophic nails debrided using sterile Nipper and electrical junior to patient tolerance, Betadine applied, patient educated on daily foot hygiene for the management of fungal infection. Discussed with aide the use of wider toe box shoes to accommodate for patient bunion deformity and pain to the medial eminence, patient also educated on the use of emollient to avoid dry skin and skin fissures for relief. Staff is concerned about bunion of left foot. The bunion is not inflamed at this time. No evidence of ulceration. It is podiatry's recommendation that patient just use proper site shoes prior to entertaining surgical intervention. Staff given written instruction. Return for follow-up         Diagnoses and all orders for this visit:     Atherosclerosis of arteries of extremities ([address])     Hallux valgus left. Deformity of both feet     Pain in both feet     Onychomycosis     Ingrown toenail     Callus            Subjective:       Patient ID: Kelsea Garrett is a 79 y.o. female. 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 The following portions of the patient's history were reviewed and updated as appropriate: allergies, current medications, past family history, past medical history, past social history, past surgical history and problem list.     Review of Systems   Constitutional: Negative. Respiratory: Negative. Cardiovascular: Negative.     Musculoskeletal: Positive for arthralgias and joint swelling. Skin: Positive for color change. Historical Information          Medical History           Past Medical History:   Diagnosis Date    Dementia ([address])      Depression with anxiety       resolved: 10/12/2017    Disease of thyroid gland      Diverticulosis      Down's syndrome      Fatty liver      Hyperkalemia       last assessed: 7/15/2014    Hyperlipidemia      Hypothyroidism       last assessed: 8/31/2017    Mental retardation      Osteoporosis      Psychiatric disorder           Surgical History   No past surgical history on file. Social History          Social History            Substance and Sexual Activity   Alcohol Use No      Social History            Substance and Sexual Activity   Drug Use No      Social History            Tobacco Use   Smoking Status Never   Smokeless Tobacco Never      Family History:   Family History             Family History   Problem Relation Age of Onset    No Known Problems Mother      No Known Problems Family              Meds/Allergies   all medications and allergies reviewed  No Known Allergies     Objective:            Physical Exam  Constitutional:       General: She is not in acute distress. Appearance: She is well-developed. She is not ill-appearing, toxic-appearing or diaphoretic. HENT:      Head: Normocephalic. Cardiovascular:      Pulses:           Dorsalis pedis pulses are 1+ on the right side and 1+ on the left side. Posterior tibial pulses are 0 on the right side and 0 on the left side. Comments: Palpable DP pulse, nonpalpable PT pulse, CFT is less than 3 seconds, temperature gradient within normal limit, a trophic skin changes noted with skin thinning in shiny, patient report occasional claudication to bilateral calf, localized edema foot and ankle Q9  Pulmonary:      Effort: Pulmonary effort is normal.   Musculoskeletal:         General: Tenderness and deformity present.       Comments: Pes planus type foot pain on palpation and range of motion of the 1st MPJ, metatarsal heads bilateral foot, pain on palpation on range of motion of the ST joint, crepitus noted in midfoot joint. Severe HAV deformity bilateral with overlapping hallux bilateral hammertoe deformity   Skin:     General: Skin is dry. Capillary Refill: Capillary refill takes 2 to 3 seconds. Comments: Trophic skin changes bilateral foot and ankle, alternating hypopigmentation and hyperpigmentation patches around the foot and ankle on anterior leg, skin is shiny and thin, absent hair growth, atrophy of fat pad. Multiple callus formation plantar foot painful on palpation to plantar heel bilateral, skin lines absent, hyperkeratotic rim and central atrophy noted. Thickened dystrophic discolored toenails of bilateral hallux, 5th digit bilateral, subungual debris and painful upon palpation, all other nails show signs of dystrophy with no subungual debris, all nails have malodor. Neurological:      Mental Status: She is alert and oriented to person, place, and time. Sensory: Sensory deficit present. Motor: Atrophy present.       Deep Tendon Reflexes: Reflexes abnormal.     Foot Exam     Right Foot/Ankle      Neurovascular  Dorsalis pedis: 1+  Posterior tibial: 0        Left Foot/Ankle       Neurovascular  Dorsalis pedis: 1+  Posterior tibial: 0

## 2024-02-15 ENCOUNTER — OFFICE VISIT (OUTPATIENT)
Age: 69
End: 2024-02-15
Payer: MEDICARE

## 2024-02-15 VITALS — RESPIRATION RATE: 17 BRPM | BODY MASS INDEX: 22.21 KG/M2 | WEIGHT: 96 LBS | HEIGHT: 55 IN

## 2024-02-15 DIAGNOSIS — M79.672 PAIN IN BOTH FEET: ICD-10-CM

## 2024-02-15 DIAGNOSIS — L84 CALLUS: ICD-10-CM

## 2024-02-15 DIAGNOSIS — M79.671 PAIN IN BOTH FEET: ICD-10-CM

## 2024-02-15 DIAGNOSIS — B35.1 ONYCHOMYCOSIS: ICD-10-CM

## 2024-02-15 DIAGNOSIS — I70.209 ATHEROSCLEROSIS OF ARTERIES OF EXTREMITIES (HCC): Primary | ICD-10-CM

## 2024-02-15 PROCEDURE — 11056 PARNG/CUTG B9 HYPRKR LES 2-4: CPT | Performed by: PODIATRIST

## 2024-02-15 PROCEDURE — 11720 DEBRIDE NAIL 1-5: CPT | Performed by: PODIATRIST

## 2024-02-15 NOTE — PROGRESS NOTES
Assessment/Plan:     Chart reviewed.  Patient examined with staff at chair side.     Patient opted out of oral and topical anti fungal medications at this time, all onychomycotic dystrophic nails debrided using sterile Nipper and electrical junior to patient tolerance, Betadine applied, patient educated on daily foot hygiene for the management of fungal infection.  Discussed with aide the use of wider toe box shoes to accommodate for patient bunion deformity and pain to the medial eminence, patient also educated on the use of emollient to avoid dry skin and skin fissures for relief.     Staff is concerned about bunion of left foot.  The bunion is not inflamed at this time.  No evidence of ulceration.  It is podiatry's recommendation that patient just use proper site shoes prior to entertaining surgical intervention.     Staff given written instruction.  Return for follow-up         Diagnoses and all orders for this visit:     Atherosclerosis of arteries of extremities (HCC)     Hallux valgus left.     Deformity of both feet     Pain in both feet     Onychomycosis     Ingrown toenail     Callus            Subjective:       Patient ID: Nita Carlton is a 68 y.o. female.     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        The following portions of the patient's history were reviewed and updated as appropriate: allergies, current medications, past family history, past medical history, past social history, past surgical history and problem list.     Review of Systems   Constitutional: Negative.    Respiratory: Negative.    Cardiovascular: Negative.    Musculoskeletal: Positive for arthralgias  and joint swelling.   Skin: Positive for color change.                   Historical Information          Medical History           Past Medical History:   Diagnosis Date    Dementia (HCC)      Depression with anxiety       resolved: 10/12/2017    Disease of thyroid gland      Diverticulosis      Down's syndrome      Fatty liver      Hyperkalemia       last assessed: 7/15/2014    Hyperlipidemia      Hypothyroidism       last assessed: 8/31/2017    Mental retardation      Osteoporosis      Psychiatric disorder           Surgical History   No past surgical history on file.      Social History          Social History            Substance and Sexual Activity   Alcohol Use No      Social History            Substance and Sexual Activity   Drug Use No      Social History            Tobacco Use   Smoking Status Never   Smokeless Tobacco Never      Family History:   Family History             Family History   Problem Relation Age of Onset    No Known Problems Mother      No Known Problems Family              Meds/Allergies   all medications and allergies reviewed  No Known Allergies     Objective:            Physical Exam  Constitutional:       General: She is not in acute distress.     Appearance: She is well-developed. She is not ill-appearing, toxic-appearing or diaphoretic.   HENT:      Head: Normocephalic.   Cardiovascular:      Pulses:           Dorsalis pedis pulses are 1+ on the right side and 1+ on the left side.        Posterior tibial pulses are 0 on the right side and 0 on the left side.      Comments: Palpable DP pulse, nonpalpable PT pulse, CFT is less than 3 seconds, temperature gradient within normal limit, a trophic skin changes noted with skin thinning in shiny, patient report occasional claudication to bilateral calf, localized edema foot and ankle Q9  Pulmonary:      Effort: Pulmonary effort is normal.   Musculoskeletal:         General: Tenderness and deformity present.      Comments: Pes planus type foot  pain on palpation and range of motion of the 1st MPJ, metatarsal heads bilateral foot, pain on palpation on range of motion of the ST joint, crepitus noted in midfoot joint.    Severe HAV deformity bilateral with overlapping hallux bilateral hammertoe deformity   Skin:     General: Skin is dry.      Capillary Refill: Capillary refill takes 2 to 3 seconds.      Comments: Trophic skin changes bilateral foot and ankle, alternating hypopigmentation and hyperpigmentation patches around the foot and ankle on anterior leg, skin is shiny and thin, absent hair growth, atrophy of fat pad.    Multiple callus formation plantar foot painful on palpation to plantar heel bilateral, skin lines absent, hyperkeratotic rim and central atrophy noted.    Thickened dystrophic discolored toenails of bilateral hallux, 5th digit bilateral, subungual debris and painful upon palpation, all other nails show signs of dystrophy with no subungual debris, all nails have malodor.   Neurological:      Mental Status: She is alert and oriented to person, place, and time.      Sensory: Sensory deficit present.      Motor: Atrophy present.      Deep Tendon Reflexes: Reflexes abnormal.     Foot Exam     Right Foot/Ankle      Neurovascular  Dorsalis pedis: 1+  Posterior tibial: 0        Left Foot/Ankle       Neurovascular  Dorsalis pedis: 1+  Posterior tibial: 0

## 2024-05-16 ENCOUNTER — OFFICE VISIT (OUTPATIENT)
Age: 69
End: 2024-05-16
Payer: MEDICARE

## 2024-05-16 VITALS — RESPIRATION RATE: 17 BRPM | BODY MASS INDEX: 22.21 KG/M2 | HEIGHT: 55 IN | WEIGHT: 96 LBS

## 2024-05-16 DIAGNOSIS — M79.672 PAIN IN BOTH FEET: ICD-10-CM

## 2024-05-16 DIAGNOSIS — M79.671 PAIN IN BOTH FEET: ICD-10-CM

## 2024-05-16 DIAGNOSIS — B35.1 ONYCHOMYCOSIS: ICD-10-CM

## 2024-05-16 DIAGNOSIS — L84 CALLUS: ICD-10-CM

## 2024-05-16 DIAGNOSIS — M21.961 DEFORMITY OF BOTH FEET: ICD-10-CM

## 2024-05-16 DIAGNOSIS — M21.962 DEFORMITY OF BOTH FEET: ICD-10-CM

## 2024-05-16 DIAGNOSIS — M20.12 HALLUX VALGUS OF LEFT FOOT: ICD-10-CM

## 2024-05-16 DIAGNOSIS — I70.209 ATHEROSCLEROSIS OF ARTERIES OF EXTREMITIES (HCC): Primary | ICD-10-CM

## 2024-05-16 PROCEDURE — 11720 DEBRIDE NAIL 1-5: CPT | Performed by: PODIATRIST

## 2024-05-16 PROCEDURE — 11056 PARNG/CUTG B9 HYPRKR LES 2-4: CPT | Performed by: PODIATRIST

## 2024-07-15 ENCOUNTER — TELEPHONE (OUTPATIENT)
Dept: GASTROENTEROLOGY | Facility: CLINIC | Age: 69
End: 2024-07-15

## 2024-07-15 NOTE — TELEPHONE ENCOUNTER
Pt is due for a 10 yr colon recall.for hx of Sigmoid erythema, diverticulosis. Frm Dr. Trejo pt. I lmom for pt to please call back to schedule with one of our GI providers as Dr Trejo has retired. Will call again if do not hear back from her.

## 2024-07-22 ENCOUNTER — TELEPHONE (OUTPATIENT)
Age: 69
End: 2024-07-22

## 2024-07-22 ENCOUNTER — HOSPITAL ENCOUNTER (EMERGENCY)
Facility: HOSPITAL | Age: 69
Discharge: HOME | End: 2024-07-22
Attending: EMERGENCY MEDICINE | Admitting: EMERGENCY MEDICINE
Payer: COMMERCIAL

## 2024-07-22 VITALS
RESPIRATION RATE: 18 BRPM | WEIGHT: 100 LBS | SYSTOLIC BLOOD PRESSURE: 165 MMHG | HEIGHT: 55 IN | DIASTOLIC BLOOD PRESSURE: 69 MMHG | OXYGEN SATURATION: 95 % | TEMPERATURE: 97.5 F | BODY MASS INDEX: 23.14 KG/M2 | HEART RATE: 95 BPM
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
## 2024-07-22 PROCEDURE — 99283 EMERGENCY DEPT VISIT LOW MDM: CPT

## 2024-07-22 PROCEDURE — 99283 EMERGENCY DEPT VISIT LOW MDM: CPT | Performed by: EMERGENCY MEDICINE

## 2024-07-22 NOTE — TELEPHONE ENCOUNTER
I lmom for pt to please call back to schedule with one of our GI providers as Dr Trejo has retired. Will call again if do not hear back from her.
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
## 2024-07-22 NOTE — TELEPHONE ENCOUNTER
Lmom for staff in regards to patient seeing her PCP with in the past 6 month due to medicare guidelines , if patient hasn't seen PCP we recommend schedule a visit witH PCP

## 2024-07-24 NOTE — TELEPHONE ENCOUNTER
[facility] pt's caregiver (has consent) returning a call re: recall colonoscopy. Per [facility] pt is established w/ another gastro provider Dr. Gomez. Pt had ov w/ Dr. Gomez a few months ago. Pt is now doing color guard every year and the most recent was negative. Pt will not be doing colonoscopy.

## 2024-08-01 ENCOUNTER — TELEPHONE (OUTPATIENT)
Age: 69
End: 2024-08-01

## 2024-08-01 NOTE — TELEPHONE ENCOUNTER
Lmom for staff on regards to PCP . Pt would need to see PCP with in 6 month due to medicare guidelines

## 2024-08-08 ENCOUNTER — TELEPHONE (OUTPATIENT)
Age: 69
End: 2024-08-08

## 2024-08-15 ENCOUNTER — OFFICE VISIT (OUTPATIENT)
Age: 69
End: 2024-08-15
Payer: MEDICARE

## 2024-08-15 VITALS — HEIGHT: 55 IN | RESPIRATION RATE: 17 BRPM | WEIGHT: 100 LBS | BODY MASS INDEX: 23.14 KG/M2

## 2024-08-15 DIAGNOSIS — I70.209 ATHEROSCLEROSIS OF ARTERIES OF EXTREMITIES (HCC): Primary | ICD-10-CM

## 2024-08-15 DIAGNOSIS — B35.1 ONYCHOMYCOSIS: ICD-10-CM

## 2024-08-15 DIAGNOSIS — M79.672 PAIN IN BOTH FEET: ICD-10-CM

## 2024-08-15 DIAGNOSIS — M79.671 PAIN IN BOTH FEET: ICD-10-CM

## 2024-08-15 DIAGNOSIS — L84 CALLUS: ICD-10-CM

## 2024-08-15 PROCEDURE — RECHECK: Performed by: PODIATRIST

## 2024-08-15 PROCEDURE — 11056 PARNG/CUTG B9 HYPRKR LES 2-4: CPT | Performed by: PODIATRIST

## 2024-08-15 PROCEDURE — 11720 DEBRIDE NAIL 1-5: CPT | Performed by: PODIATRIST

## 2024-08-15 NOTE — PROGRESS NOTES
Assessment/Plan:     Chart reviewed.  Patient examined with staff at chair side.     Patient opted out of oral and topical anti fungal medications at this time, all onychomycotic dystrophic nails debrided using sterile Nipper and electrical junior to patient tolerance, Betadine applied, patient educated on daily foot hygiene for the management of fungal infection.  Discussed with aide the use of wider toe box shoes to accommodate for patient bunion deformity and pain to the medial eminence, patient also educated on the use of emollient to avoid dry skin and skin fissures for relief.     Staff is concerned about bunion of left foot.  The bunion is not inflamed at this time.  No evidence of ulceration.  It is podiatry's recommendation that patient just use proper site shoes prior to entertaining surgical intervention.     Staff given written instruction.  Return for follow-up         Diagnoses and all orders for this visit:     Atherosclerosis of arteries of extremities (HCC)     Hallux valgus left.     Deformity of both feet     Pain in both feet     Onychomycosis     Ingrown toenail     Callus            Subjective:       Patient ID: Nita Carlton is a 69 y.o. female.     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        The following portions of the patient's history were reviewed and updated as appropriate: allergies, current medications, past family history, past medical history, past social history, past surgical history and problem list.     Review of Systems   Constitutional: Negative.    Respiratory: Negative.    Cardiovascular: Negative.    Musculoskeletal: Positive for arthralgias  and joint swelling.   Skin: Positive for color change.                   Historical Information          Medical History           Past Medical History:   Diagnosis Date    Dementia (HCC)      Depression with anxiety       resolved: 10/12/2017    Disease of thyroid gland      Diverticulosis      Down's syndrome      Fatty liver      Hyperkalemia       last assessed: 7/15/2014    Hyperlipidemia      Hypothyroidism       last assessed: 8/31/2017    Mental retardation      Osteoporosis      Psychiatric disorder           Surgical History   No past surgical history on file.      Social History          Social History            Substance and Sexual Activity   Alcohol Use No      Social History            Substance and Sexual Activity   Drug Use No      Social History            Tobacco Use   Smoking Status Never   Smokeless Tobacco Never      Family History:   Family History             Family History   Problem Relation Age of Onset    No Known Problems Mother      No Known Problems Family              Meds/Allergies   all medications and allergies reviewed  No Known Allergies     Objective:            Physical Exam  Constitutional:       General: She is not in acute distress.     Appearance: She is well-developed. She is not ill-appearing, toxic-appearing or diaphoretic.   HENT:      Head: Normocephalic.   Cardiovascular:      Pulses:           Dorsalis pedis pulses are 1+ on the right side and 1+ on the left side.        Posterior tibial pulses are 0 on the right side and 0 on the left side.      Comments: Palpable DP pulse, nonpalpable PT pulse, CFT is less than 3 seconds, temperature gradient within normal limit, a trophic skin changes noted with skin thinning in shiny, patient report occasional claudication to bilateral calf, localized edema foot and ankle Q9  Pulmonary:      Effort: Pulmonary effort is normal.   Musculoskeletal:         General: Tenderness and deformity present.      Comments: Pes planus type foot  pain on palpation and range of motion of the 1st MPJ, metatarsal heads bilateral foot, pain on palpation on range of motion of the ST joint, crepitus noted in midfoot joint.    Severe HAV deformity bilateral with overlapping hallux bilateral hammertoe deformity   Skin:     General: Skin is dry.      Capillary Refill: Capillary refill takes 2 to 3 seconds.      Comments: Trophic skin changes bilateral foot and ankle, alternating hypopigmentation and hyperpigmentation patches around the foot and ankle on anterior leg, skin is shiny and thin, absent hair growth, atrophy of fat pad.    Multiple callus formation plantar foot painful on palpation to plantar heel bilateral, skin lines absent, hyperkeratotic rim and central atrophy noted.    Thickened dystrophic discolored toenails of bilateral hallux, 5th digit bilateral, subungual debris and painful upon palpation, all other nails show signs of dystrophy with no subungual debris, all nails have malodor.   Neurological:      Mental Status: She is alert and oriented to person, place, and time.      Sensory: Sensory deficit present.      Motor: Atrophy present.      Deep Tendon Reflexes: Reflexes abnormal.     Foot Exam     Right Foot/Ankle      Neurovascular  Dorsalis pedis: 1+  Posterior tibial: 0        Left Foot/Ankle       Neurovascular  Dorsalis pedis: 1+  Posterior tibial: 0

## 2024-11-06 ENCOUNTER — OFFICE VISIT (OUTPATIENT)
Dept: OTOLARYNGOLOGY | Facility: CLINIC | Age: 69
End: 2024-11-06
Payer: MEDICARE

## 2024-11-06 VITALS — BODY MASS INDEX: 23.14 KG/M2 | WEIGHT: 100 LBS | HEIGHT: 55 IN

## 2024-11-06 DIAGNOSIS — H61.23 BILATERAL IMPACTED CERUMEN: Primary | ICD-10-CM

## 2024-11-06 PROCEDURE — 99203 OFFICE O/P NEW LOW 30 MIN: CPT | Performed by: NURSE PRACTITIONER

## 2024-11-06 PROCEDURE — 69210 REMOVE IMPACTED EAR WAX UNI: CPT | Performed by: NURSE PRACTITIONER

## 2024-11-06 RX ORDER — LEVOTHYROXINE SODIUM 50 UG/1
TABLET ORAL
COMMUNITY
Start: 2024-10-25

## 2024-11-06 RX ORDER — FLUVOXAMINE MALEATE 50 MG
TABLET ORAL
COMMUNITY
Start: 2024-10-25

## 2024-11-06 NOTE — PROGRESS NOTES
"Ambulatory Visit  Name: Nita Carlton      : 1955      MRN: [id number]  Encounter Provider: JIMMY Valladares  Encounter Date: 2024   Encounter department: [facility] [place]    Assessment & Plan      Here today with caregiver, from Group home.  Prior ENT care every 6 months at [facility] for cerumen removal    Note pt fearful but did well when allowed to help hold instruments  On exam noted bilateral cerumen impaction and unable to fully view tympanic membrane.  Cerumen impaction removed bilateral eac with #5 suction, pt tolerated procedure well.  Upon removal, improved hearing and decreased clogged sensation of bilateral ears.  Discussed routine cerumen care including avoidance of q-tips, may use cerumen softeners every one to two months.  Hydrocortisone cream pea sized amount on finger as needed for itching in ears.  Encourage ongoing follow up in 6 months to monitor for cerumen and hearing.  Audiogram if symptoms worsen.      History of Present Illness     Nita Carlton is a 69 y.o. female who presents today as a new patient due to ear concerns.  Hearing gradually worsening.  Bilateral ears feel blocked.  No tinnitus.  No otalgia or otorrhea.  No history of ear surgery.  No current hearing aids.  Prior ENT care with [facility].   Caregiver with patient, group home, Down syndrome      History obtained from : patient's guardian  Review of Systems   Constitutional: Negative.    HENT:  Positive for hearing loss. Negative for congestion, ear discharge, ear pain, nosebleeds, postnasal drip, rhinorrhea, sinus pressure, sinus pain, sore throat, tinnitus and voice change.    Respiratory:  Negative for chest tightness and shortness of breath.    Skin:  Negative for color change.   Neurological:  Negative for dizziness, numbness and headaches.   Psychiatric/Behavioral: Negative.       Medical History Reviewed by provider this encounter:           Objective     Ht 4' 3\" (1.295 m)   Wt 45.4 kg (100 " lb)   BMI 27.03 kg/m²     Physical Exam  Vitals and nursing note reviewed. Exam conducted with a chaperone present.   Constitutional:       General: She is not in acute distress.     Appearance: She is well-developed.   HENT:      Head: Normocephalic and atraumatic.      Right Ear: Tympanic membrane, ear canal and external ear normal. There is impacted cerumen.      Left Ear: Tympanic membrane, ear canal and external ear normal. There is impacted cerumen.      Nose: Nose normal.      Mouth/Throat:      Mouth: Mucous membranes are moist.   Pulmonary:      Effort: Pulmonary effort is normal. No respiratory distress.   Musculoskeletal:      Cervical back: Neck supple.   Skin:     General: Skin is warm and dry.   Neurological:      Mental Status: She is alert.      Gait: Gait abnormal.   Psychiatric:         Mood and Affect: Mood normal.

## 2024-11-19 ENCOUNTER — OFFICE VISIT (OUTPATIENT)
Age: 69
End: 2024-11-19
Payer: MEDICARE

## 2024-11-19 VITALS — BODY MASS INDEX: 23.14 KG/M2 | HEIGHT: 55 IN | RESPIRATION RATE: 17 BRPM | WEIGHT: 100 LBS

## 2024-11-19 DIAGNOSIS — M79.672 PAIN IN BOTH FEET: Primary | ICD-10-CM

## 2024-11-19 DIAGNOSIS — M20.12 HALLUX VALGUS OF LEFT FOOT: ICD-10-CM

## 2024-11-19 DIAGNOSIS — I70.209 ATHEROSCLEROSIS OF ARTERIES OF EXTREMITIES (HCC): ICD-10-CM

## 2024-11-19 DIAGNOSIS — B35.1 ONYCHOMYCOSIS: ICD-10-CM

## 2024-11-19 DIAGNOSIS — M79.671 PAIN IN BOTH FEET: Primary | ICD-10-CM

## 2024-11-19 DIAGNOSIS — L84 CALLUS: ICD-10-CM

## 2024-11-19 PROCEDURE — 11056 PARNG/CUTG B9 HYPRKR LES 2-4: CPT | Performed by: PODIATRIST

## 2024-11-19 PROCEDURE — RECHECK: Performed by: PODIATRIST

## 2024-11-19 PROCEDURE — 11720 DEBRIDE NAIL 1-5: CPT | Performed by: PODIATRIST

## 2025-02-18 ENCOUNTER — OFFICE VISIT (OUTPATIENT)
Age: 70
End: 2025-02-18
Payer: MEDICARE

## 2025-02-18 DIAGNOSIS — L84 CALLUS: ICD-10-CM

## 2025-02-18 DIAGNOSIS — M79.672 PAIN IN BOTH FEET: Primary | ICD-10-CM

## 2025-02-18 DIAGNOSIS — I70.209 ATHEROSCLEROSIS OF ARTERIES OF EXTREMITIES (HCC): ICD-10-CM

## 2025-02-18 DIAGNOSIS — B35.1 ONYCHOMYCOSIS: ICD-10-CM

## 2025-02-18 DIAGNOSIS — M79.671 PAIN IN BOTH FEET: Primary | ICD-10-CM

## 2025-02-18 PROCEDURE — 11720 DEBRIDE NAIL 1-5: CPT | Performed by: PODIATRIST

## 2025-02-18 PROCEDURE — RECHECK: Performed by: PODIATRIST

## 2025-02-18 PROCEDURE — 11056 PARNG/CUTG B9 HYPRKR LES 2-4: CPT | Performed by: PODIATRIST

## 2025-02-18 RX ORDER — ROSUVASTATIN CALCIUM 5 MG/1
TABLET, COATED ORAL
COMMUNITY
Start: 2025-01-16

## 2025-05-21 ENCOUNTER — PROCEDURE VISIT (OUTPATIENT)
Age: 70
End: 2025-05-21
Payer: MEDICARE

## 2025-05-21 VITALS — HEIGHT: 55 IN | WEIGHT: 100 LBS | RESPIRATION RATE: 16 BRPM | BODY MASS INDEX: 23.14 KG/M2

## 2025-05-21 DIAGNOSIS — I70.209 ATHEROSCLEROSIS OF ARTERIES OF EXTREMITIES (HCC): Primary | ICD-10-CM

## 2025-05-21 DIAGNOSIS — B35.1 ONYCHOMYCOSIS: ICD-10-CM

## 2025-05-21 DIAGNOSIS — L84 CALLUS: ICD-10-CM

## 2025-05-21 DIAGNOSIS — M79.671 PAIN IN BOTH FEET: ICD-10-CM

## 2025-05-21 DIAGNOSIS — M79.672 PAIN IN BOTH FEET: ICD-10-CM

## 2025-05-21 PROCEDURE — 11056 PARNG/CUTG B9 HYPRKR LES 2-4: CPT | Performed by: PODIATRIST

## 2025-05-21 PROCEDURE — RECHECK: Performed by: PODIATRIST

## 2025-05-21 PROCEDURE — 11720 DEBRIDE NAIL 1-5: CPT | Performed by: PODIATRIST

## 2025-05-27 ENCOUNTER — OFFICE VISIT (OUTPATIENT)
Dept: OTOLARYNGOLOGY | Facility: CLINIC | Age: 70
End: 2025-05-27
Payer: MEDICARE

## 2025-05-27 VITALS — TEMPERATURE: 97.9 F | BODY MASS INDEX: 23.14 KG/M2 | WEIGHT: 100 LBS | HEIGHT: 55 IN

## 2025-05-27 DIAGNOSIS — H61.23 BILATERAL IMPACTED CERUMEN: Primary | ICD-10-CM

## 2025-05-27 PROCEDURE — 99213 OFFICE O/P EST LOW 20 MIN: CPT | Performed by: NURSE PRACTITIONER

## 2025-05-27 PROCEDURE — 69210 REMOVE IMPACTED EAR WAX UNI: CPT | Performed by: NURSE PRACTITIONER

## 2025-05-27 RX ORDER — LEVOTHYROXINE SODIUM 75 UG/1
75 TABLET ORAL
COMMUNITY
Start: 2025-05-05

## 2025-05-27 NOTE — PROGRESS NOTES
":  Assessment & Plan  Bilateral impacted cerumen           Here today with caregiver, from Group home.  Prior ENT care every 6 months at [facility] for cerumen removal     Note pt fearful but did well when allowed to help hold instruments  On exam noted bilateral cerumen impaction and unable to fully view tympanic membrane.  Cerumen impaction removed bilateral eac with #5 suction, pt tolerated procedure very well.  Upon removal, improved hearing and decreased clogged sensation of bilateral ears.  Discussed routine cerumen care including avoidance of q-tips, may use cerumen softeners every one to two months.  Hydrocortisone cream pea sized amount on finger as needed for itching in ears.  Encourage ongoing follow up in 6 months to monitor for cerumen and hearing.  Audiogram if symptoms worsen.    History of Present Illness     Nita Carlton is a 69 y.o. female    Presents today for follow up due to ear concerns.  Hearing gradually worsening.  Bilateral ears feel blocked.  No tinnitus.  No otalgia or otorrhea.  No history of ear surgery.  No current hearing aids.  Prior ENT care with [facility].   Caregiver with patient, group home, Down syndrome        Review of Systems   Constitutional: Negative.    HENT:  Negative for congestion, ear discharge, ear pain, hearing loss, nosebleeds, postnasal drip, rhinorrhea, sinus pressure, sinus pain, sore throat, tinnitus and voice change.    Respiratory:  Negative for chest tightness and shortness of breath.    Skin:  Negative for color change.   Neurological:  Negative for dizziness, numbness and headaches.   Psychiatric/Behavioral: Negative.       Objective   Temp 97.9 °F (36.6 °C) (Temporal)   Ht 4' 3\" (1.295 m)   Wt 45.4 kg (100 lb)   BMI 27.03 kg/m²      Physical Exam  Vitals and nursing note reviewed.   Constitutional:       General: She is not in acute distress.     Appearance: She is well-developed.   HENT:      Head: Normocephalic and atraumatic.      Right Ear: " Tympanic membrane, ear canal and external ear normal. There is impacted cerumen.      Left Ear: Tympanic membrane, ear canal and external ear normal. There is impacted cerumen.      Nose: Rhinorrhea present.      Mouth/Throat:      Mouth: Mucous membranes are moist.   Pulmonary:      Effort: Pulmonary effort is normal. No respiratory distress.     Musculoskeletal:      Cervical back: Neck supple.     Skin:     General: Skin is warm and dry.     Neurological:      Mental Status: She is alert.      Comments: Down syndrome     Psychiatric:         Mood and Affect: Mood normal.       Ear cerumen removal    Date/Time: 5/27/2025 9:30 AM    Performed by: JIMMY Valladares  Authorized by: JIMMY Valladares    Universal Protocol:  procedure performed by consultantConsent: Verbal consent obtained  Risks and benefits: risks, benefits and alternatives were discussed  Consent given by: patient  Patient understanding: patient states understanding of the procedure being performed    Patient location:  Clinic  Procedure details:     Local anesthetic:  None    Location:  L ear and R ear    Approach:  External  Post-procedure details:     Complication:  None    Hearing quality:  Normal    Patient tolerance of procedure:  Tolerated well, no immediate complications

## 2025-08-20 ENCOUNTER — PROCEDURE VISIT (OUTPATIENT)
Age: 70
End: 2025-08-20
Payer: MEDICARE

## 2025-08-20 VITALS — RESPIRATION RATE: 16 BRPM | HEIGHT: 55 IN | BODY MASS INDEX: 23.14 KG/M2 | WEIGHT: 100 LBS

## 2025-08-20 DIAGNOSIS — M20.12 HALLUX VALGUS OF LEFT FOOT: ICD-10-CM

## 2025-08-20 DIAGNOSIS — M21.619 BUNION: ICD-10-CM

## 2025-08-20 DIAGNOSIS — I70.209 ATHEROSCLEROSIS OF ARTERIES OF EXTREMITIES (HCC): Primary | ICD-10-CM

## 2025-08-20 DIAGNOSIS — M79.671 PAIN IN BOTH FEET: ICD-10-CM

## 2025-08-20 DIAGNOSIS — M79.672 PAIN IN BOTH FEET: ICD-10-CM

## 2025-08-20 DIAGNOSIS — B35.1 ONYCHOMYCOSIS: ICD-10-CM

## 2025-08-20 PROCEDURE — 99212 OFFICE O/P EST SF 10 MIN: CPT | Performed by: PODIATRIST

## 2025-08-20 PROCEDURE — 11721 DEBRIDE NAIL 6 OR MORE: CPT | Performed by: PODIATRIST
